# Patient Record
Sex: FEMALE | Race: BLACK OR AFRICAN AMERICAN | Employment: FULL TIME | URBAN - METROPOLITAN AREA
[De-identification: names, ages, dates, MRNs, and addresses within clinical notes are randomized per-mention and may not be internally consistent; named-entity substitution may affect disease eponyms.]

---

## 2020-07-20 ENCOUNTER — HOSPITAL ENCOUNTER (EMERGENCY)
Age: 40
Discharge: HOME | End: 2020-07-20
Attending: EMERGENCY MEDICINE
Payer: COMMERCIAL

## 2020-07-20 ENCOUNTER — APPOINTMENT (OUTPATIENT)
Dept: CT IMAGING | Age: 40
End: 2020-07-20
Payer: COMMERCIAL

## 2020-07-20 VITALS
HEIGHT: 63 IN | WEIGHT: 139 LBS | TEMPERATURE: 98.4 F | RESPIRATION RATE: 14 BRPM | HEART RATE: 57 BPM | BODY MASS INDEX: 24.63 KG/M2 | DIASTOLIC BLOOD PRESSURE: 97 MMHG | SYSTOLIC BLOOD PRESSURE: 134 MMHG | OXYGEN SATURATION: 100 %

## 2020-07-20 LAB
ALBUMIN SERPL-MCNC: 4.4 GM/DL (ref 3.4–5)
ALP BLD-CCNC: 48 IU/L (ref 40–128)
ALT SERPL-CCNC: 10 U/L (ref 10–40)
ANION GAP SERPL CALCULATED.3IONS-SCNC: 12 MMOL/L (ref 4–16)
AST SERPL-CCNC: 14 IU/L (ref 15–37)
BACTERIA: NEGATIVE /HPF
BASOPHILS ABSOLUTE: 0 K/CU MM
BASOPHILS RELATIVE PERCENT: 0.3 % (ref 0–1)
BILIRUB SERPL-MCNC: 0.2 MG/DL (ref 0–1)
BILIRUBIN URINE: NEGATIVE MG/DL
BLOOD, URINE: NEGATIVE
BUN BLDV-MCNC: 13 MG/DL (ref 6–23)
CALCIUM SERPL-MCNC: 9.1 MG/DL (ref 8.3–10.6)
CHLORIDE BLD-SCNC: 101 MMOL/L (ref 99–110)
CLARITY: CLEAR
CO2: 26 MMOL/L (ref 21–32)
COLOR: ABNORMAL
CREAT SERPL-MCNC: 0.8 MG/DL (ref 0.6–1.1)
DIFFERENTIAL TYPE: ABNORMAL
EOSINOPHILS ABSOLUTE: 0.2 K/CU MM
EOSINOPHILS RELATIVE PERCENT: 1 % (ref 0–3)
GFR AFRICAN AMERICAN: >60 ML/MIN/1.73M2
GFR NON-AFRICAN AMERICAN: >60 ML/MIN/1.73M2
GLUCOSE BLD-MCNC: 114 MG/DL (ref 70–99)
GLUCOSE, URINE: NEGATIVE MG/DL
GONADOTROPIN, CHORIONIC (HCG) QUANT: <0.5 UIU/ML
HCT VFR BLD CALC: 37.8 % (ref 37–47)
HEMOGLOBIN: 12.3 GM/DL (ref 12.5–16)
IMMATURE NEUTROPHIL %: 0.4 % (ref 0–0.43)
KETONES, URINE: NEGATIVE MG/DL
LEUKOCYTE ESTERASE, URINE: NEGATIVE
LYMPHOCYTES ABSOLUTE: 2 K/CU MM
LYMPHOCYTES RELATIVE PERCENT: 13.6 % (ref 24–44)
MAGNESIUM: 2.2 MG/DL (ref 1.8–2.4)
MCH RBC QN AUTO: 29 PG (ref 27–31)
MCHC RBC AUTO-ENTMCNC: 32.5 % (ref 32–36)
MCV RBC AUTO: 89.2 FL (ref 78–100)
MONOCYTES ABSOLUTE: 1.1 K/CU MM
MONOCYTES RELATIVE PERCENT: 7.4 % (ref 0–4)
NITRITE URINE, QUANTITATIVE: NEGATIVE
NUCLEATED RBC %: 0 %
PDW BLD-RTO: 14.7 % (ref 11.7–14.9)
PH, URINE: 7 (ref 5–8)
PLATELET # BLD: 289 K/CU MM (ref 140–440)
PMV BLD AUTO: 12 FL (ref 7.5–11.1)
POTASSIUM SERPL-SCNC: 3.4 MMOL/L (ref 3.5–5.1)
PROTEIN UA: NEGATIVE MG/DL
RBC # BLD: 4.24 M/CU MM (ref 4.2–5.4)
RBC URINE: ABNORMAL /HPF (ref 0–6)
SEGMENTED NEUTROPHILS ABSOLUTE COUNT: 11.3 K/CU MM
SEGMENTED NEUTROPHILS RELATIVE PERCENT: 77.3 % (ref 36–66)
SODIUM BLD-SCNC: 139 MMOL/L (ref 135–145)
SPECIFIC GRAVITY UA: 1.01 (ref 1–1.03)
SQUAMOUS EPITHELIAL: 5 /HPF
TOTAL IMMATURE NEUTOROPHIL: 0.06 K/CU MM
TOTAL NUCLEATED RBC: 0 K/CU MM
TOTAL PROTEIN: 7.5 GM/DL (ref 6.4–8.2)
TRICHOMONAS: ABNORMAL /HPF
UROBILINOGEN, URINE: NORMAL MG/DL (ref 0.2–1)
WBC # BLD: 14.6 K/CU MM (ref 4–10.5)
WBC UA: 1 /HPF (ref 0–5)

## 2020-07-20 PROCEDURE — 70450 CT HEAD/BRAIN W/O DYE: CPT

## 2020-07-20 PROCEDURE — 84702 CHORIONIC GONADOTROPIN TEST: CPT

## 2020-07-20 PROCEDURE — 81001 URINALYSIS AUTO W/SCOPE: CPT

## 2020-07-20 PROCEDURE — 99284 EMERGENCY DEPT VISIT MOD MDM: CPT

## 2020-07-20 PROCEDURE — 96374 THER/PROPH/DIAG INJ IV PUSH: CPT

## 2020-07-20 PROCEDURE — 6360000004 HC RX CONTRAST MEDICATION: Performed by: EMERGENCY MEDICINE

## 2020-07-20 PROCEDURE — 85025 COMPLETE CBC W/AUTO DIFF WBC: CPT

## 2020-07-20 PROCEDURE — 96375 TX/PRO/DX INJ NEW DRUG ADDON: CPT

## 2020-07-20 PROCEDURE — 80053 COMPREHEN METABOLIC PANEL: CPT

## 2020-07-20 PROCEDURE — 2580000003 HC RX 258: Performed by: EMERGENCY MEDICINE

## 2020-07-20 PROCEDURE — 6360000002 HC RX W HCPCS: Performed by: EMERGENCY MEDICINE

## 2020-07-20 PROCEDURE — 83735 ASSAY OF MAGNESIUM: CPT

## 2020-07-20 RX ORDER — SODIUM CHLORIDE 9 MG/ML
INJECTION, SOLUTION INTRAVENOUS CONTINUOUS
Status: DISCONTINUED | OUTPATIENT
Start: 2020-07-20 | End: 2020-07-20

## 2020-07-20 RX ORDER — NAPROXEN 500 MG/1
500 TABLET ORAL 2 TIMES DAILY
Qty: 60 TABLET | Refills: 0 | Status: ON HOLD | OUTPATIENT
Start: 2020-07-20 | End: 2022-07-05 | Stop reason: HOSPADM

## 2020-07-20 RX ORDER — DIPHENHYDRAMINE HYDROCHLORIDE 50 MG/ML
50 INJECTION INTRAMUSCULAR; INTRAVENOUS ONCE
Status: COMPLETED | OUTPATIENT
Start: 2020-07-20 | End: 2020-07-20

## 2020-07-20 RX ORDER — BUTALBITAL, ACETAMINOPHEN AND CAFFEINE 300; 40; 50 MG/1; MG/1; MG/1
1 CAPSULE ORAL EVERY 4 HOURS PRN
Qty: 84 CAPSULE | Refills: 0 | Status: SHIPPED | OUTPATIENT
Start: 2020-07-20

## 2020-07-20 RX ORDER — 0.9 % SODIUM CHLORIDE 0.9 %
1000 INTRAVENOUS SOLUTION INTRAVENOUS ONCE
Status: COMPLETED | OUTPATIENT
Start: 2020-07-20 | End: 2020-07-20

## 2020-07-20 RX ORDER — METOCLOPRAMIDE HYDROCHLORIDE 5 MG/ML
10 INJECTION INTRAMUSCULAR; INTRAVENOUS ONCE
Status: COMPLETED | OUTPATIENT
Start: 2020-07-20 | End: 2020-07-20

## 2020-07-20 RX ORDER — ONDANSETRON 4 MG/1
4 TABLET, ORALLY DISINTEGRATING ORAL EVERY 8 HOURS PRN
Qty: 15 TABLET | Refills: 0 | Status: SHIPPED | OUTPATIENT
Start: 2020-07-20 | End: 2022-03-08 | Stop reason: SDUPTHER

## 2020-07-20 RX ORDER — ACETAMINOPHEN 500 MG
1000 TABLET ORAL ONCE
Status: DISCONTINUED | OUTPATIENT
Start: 2020-07-20 | End: 2020-07-20

## 2020-07-20 RX ADMIN — IOPAMIDOL 100 ML: 755 INJECTION, SOLUTION INTRAVENOUS at 04:11

## 2020-07-20 RX ADMIN — SODIUM CHLORIDE 1000 ML: 9 INJECTION, SOLUTION INTRAVENOUS at 03:53

## 2020-07-20 RX ADMIN — DIPHENHYDRAMINE HYDROCHLORIDE 50 MG: 50 INJECTION INTRAMUSCULAR; INTRAVENOUS at 03:55

## 2020-07-20 RX ADMIN — METOCLOPRAMIDE HYDROCHLORIDE 10 MG: 5 INJECTION INTRAMUSCULAR; INTRAVENOUS at 03:58

## 2020-07-20 ASSESSMENT — ENCOUNTER SYMPTOMS
RESPIRATORY NEGATIVE: 1
PHOTOPHOBIA: 1
ALLERGIC/IMMUNOLOGIC NEGATIVE: 1
NAUSEA: 1

## 2020-07-20 ASSESSMENT — PAIN SCALES - GENERAL
PAINLEVEL_OUTOF10: 2
PAINLEVEL_OUTOF10: 8
PAINLEVEL_OUTOF10: 10

## 2020-07-20 ASSESSMENT — PAIN DESCRIPTION - PAIN TYPE
TYPE: ACUTE PAIN
TYPE: ACUTE PAIN

## 2020-07-20 ASSESSMENT — PAIN DESCRIPTION - DESCRIPTORS
DESCRIPTORS: HEADACHE
DESCRIPTORS: ACHING

## 2020-07-20 ASSESSMENT — PAIN DESCRIPTION - LOCATION
LOCATION: HEAD
LOCATION: HEAD

## 2020-07-20 ASSESSMENT — PAIN DESCRIPTION - FREQUENCY: FREQUENCY: CONTINUOUS

## 2020-07-20 NOTE — ED PROVIDER NOTES
 Drug use: Never    Sexual activity: Not on file   Lifestyle    Physical activity     Days per week: Not on file     Minutes per session: Not on file    Stress: Not on file   Relationships    Social connections     Talks on phone: Not on file     Gets together: Not on file     Attends Congregation service: Not on file     Active member of club or organization: Not on file     Attends meetings of clubs or organizations: Not on file     Relationship status: Not on file    Intimate partner violence     Fear of current or ex partner: Not on file     Emotionally abused: Not on file     Physically abused: Not on file     Forced sexual activity: Not on file   Other Topics Concern    Not on file   Social History Narrative    Not on file     No current facility-administered medications for this encounter. Current Outpatient Medications   Medication Sig Dispense Refill    butalbital-APAP-caffeine (FIORICET) -40 MG CAPS per capsule Take 1 capsule by mouth every 4 hours as needed for Headaches 84 capsule 0    ondansetron (ZOFRAN ODT) 4 MG disintegrating tablet Take 1 tablet by mouth every 8 hours as needed for Nausea 15 tablet 0    naproxen (NAPROSYN) 500 MG tablet Take 1 tablet by mouth 2 times daily 60 tablet 0      Allergies   Allergen Reactions    Flexeril [Cyclobenzaprine] Swelling    Imitrex [Sumatriptan] Other (See Comments)     Does something to her nerves.  Lyrica [Pregabalin] Other (See Comments)     Shuts her kidneys down     No current facility-administered medications for this encounter.       Current Outpatient Medications   Medication Sig Dispense Refill    butalbital-APAP-caffeine (FIORICET) -40 MG CAPS per capsule Take 1 capsule by mouth every 4 hours as needed for Headaches 84 capsule 0    ondansetron (ZOFRAN ODT) 4 MG disintegrating tablet Take 1 tablet by mouth every 8 hours as needed for Nausea 15 tablet 0    naproxen (NAPROSYN) 500 MG tablet Take 1 tablet by mouth 2 times daily 60 tablet 0       Nursing Notes Reviewed    VITAL SIGNS:  ED Triage Vitals [07/20/20 0144]   Enc Vitals Group      BP (!) 177/105      Pulse 69      Resp 18      Temp 98.4 °F (36.9 °C)      Temp Source Oral      SpO2 99 %      Weight       Height       Head Circumference       Peak Flow       Pain Score       Pain Loc       Pain Edu? Excl. in [address]? PHYSICAL EXAM:  Physical Exam  Vitals signs and nursing note reviewed. Constitutional:       General: She is not in acute distress. Appearance: Normal appearance. She is well-developed and well-groomed. She is not ill-appearing, toxic-appearing or diaphoretic. HENT:      Head: Normocephalic and atraumatic. Right Ear: External ear normal.      Left Ear: External ear normal.      Nose: No congestion or rhinorrhea. Mouth/Throat:      Pharynx: No oropharyngeal exudate or posterior oropharyngeal erythema. Eyes:      General: No scleral icterus. Right eye: No discharge. Left eye: No discharge. Extraocular Movements: Extraocular movements intact. Conjunctiva/sclera: Conjunctivae normal.      Pupils: Pupils are equal, round, and reactive to light. Neck:      Musculoskeletal: Full passive range of motion without pain and normal range of motion. Normal range of motion. No edema, erythema, neck rigidity, crepitus, injury, pain with movement or torticollis. Vascular: No JVD. Trachea: Phonation normal.      Meningeal: Brudzinski's sign absent. Cardiovascular:      Rate and Rhythm: Normal rate and regular rhythm. Pulses: Normal pulses. Heart sounds: Normal heart sounds. No murmur. No friction rub. No gallop. Pulmonary:      Effort: Pulmonary effort is normal. No respiratory distress. Breath sounds: Normal breath sounds. No stridor. No wheezing, rhonchi or rales. Abdominal:      General: Bowel sounds are normal. There is no distension. Palpations: Abdomen is soft. There is no mass. Tenderness: There is no abdominal tenderness. There is no guarding or rebound. Negative signs include Reese's sign, Rovsing's sign and McBurney's sign. Hernia: No hernia is present. Musculoskeletal: Normal range of motion. General: No swelling, tenderness, deformity or signs of injury. Right lower leg: No edema. Left lower leg: No edema. Skin:     General: Skin is warm. Coloration: Skin is not jaundiced. Findings: No bruising or lesion. Neurological:      General: No focal deficit present. Mental Status: She is alert and oriented to person, place, and time. GCS: GCS eye subscore is 4. GCS verbal subscore is 5. GCS motor subscore is 6. Cranial Nerves: Cranial nerves are intact. No cranial nerve deficit, dysarthria or facial asymmetry. Sensory: Sensation is intact. No sensory deficit. Motor: Motor function is intact. No weakness, tremor, atrophy, abnormal muscle tone or seizure activity. Coordination: Coordination is intact. Coordination normal. Finger-Nose-Finger Test normal.   Psychiatric:         Attention and Perception: Attention and perception normal.         Mood and Affect: Affect is tearful. Speech: Speech normal.         Behavior: Behavior normal. Behavior is not agitated, slowed, aggressive, withdrawn, hyperactive or combative. Behavior is cooperative. Thought Content:  Thought content normal.         Cognition and Memory: Cognition normal.         Judgment: Judgment normal.           I have reviewed andinterpreted all of the currently available lab results from this visit (if applicable):    Results for orders placed or performed during the hospital encounter of 07/20/20   CBC Auto Differential   Result Value Ref Range    WBC 14.6 (H) 4.0 - 10.5 K/CU MM    RBC 4.24 4.2 - 5.4 M/CU MM    Hemoglobin 12.3 (L) 12.5 - 16.0 GM/DL    Hematocrit 37.8 37 - 47 %    MCV 89.2 78 - 100 FL    MCH 29.0 27 - 31 PG    MCHC 32.5 32.0 - 36.0 %    RDW 14.7 11.7 - 14.9 %    Platelets 199 079 - 652 K/CU MM    MPV 12.0 (H) 7.5 - 11.1 FL    Differential Type AUTOMATED DIFFERENTIAL     Segs Relative 77.3 (H) 36 - 66 %    Lymphocytes % 13.6 (L) 24 - 44 %    Monocytes % 7.4 (H) 0 - 4 %    Eosinophils % 1.0 0 - 3 %    Basophils % 0.3 0 - 1 %    Segs Absolute 11.3 K/CU MM    Lymphocytes Absolute 2.0 K/CU MM    Monocytes Absolute 1.1 K/CU MM    Eosinophils Absolute 0.2 K/CU MM    Basophils Absolute 0.0 K/CU MM    Nucleated RBC % 0.0 %    Total Nucleated RBC 0.0 K/CU MM    Total Immature Neutrophil 0.06 K/CU MM    Immature Neutrophil % 0.4 0 - 0.43 %   CMP   Result Value Ref Range    Sodium 139 135 - 145 MMOL/L    Potassium 3.4 (L) 3.5 - 5.1 MMOL/L    Chloride 101 99 - 110 mMol/L    CO2 26 21 - 32 MMOL/L    BUN 13 6 - 23 MG/DL    CREATININE 0.8 0.6 - 1.1 MG/DL    Glucose 114 (H) 70 - 99 MG/DL    Calcium 9.1 8.3 - 10.6 MG/DL    Alb 4.4 3.4 - 5.0 GM/DL    Total Protein 7.5 6.4 - 8.2 GM/DL    Total Bilirubin 0.2 0.0 - 1.0 MG/DL    ALT 10 10 - 40 U/L    AST 14 (L) 15 - 37 IU/L    Alkaline Phosphatase 48 40 - 128 IU/L    GFR Non-African American >60 >60 mL/min/1.73m2    GFR African American >60 >60 mL/min/1.73m2    Anion Gap 12 4 - 16   Magnesium   Result Value Ref Range    Magnesium 2.2 1.8 - 2.4 mg/dl   HCG Serum, Quantitative   Result Value Ref Range    hCG Quant <0.5 UIU/ML   Urinalysis   Result Value Ref Range    Color, UA STRAW (A) YELLOW    Clarity, UA CLEAR CLEAR    Glucose, Urine NEGATIVE NEGATIVE MG/DL    Bilirubin Urine NEGATIVE NEGATIVE MG/DL    Ketones, Urine NEGATIVE NEGATIVE MG/DL    Specific Gravity, UA 1.006 1.001 - 1.035    Blood, Urine NEGATIVE NEGATIVE    pH, Urine 7.0 5.0 - 8.0    Protein, UA NEGATIVE NEGATIVE MG/DL    Urobilinogen, Urine NORMAL 0.2 - 1.0 MG/DL    Nitrite Urine, Quantitative NEGATIVE NEGATIVE    Leukocyte Esterase, Urine NEGATIVE NEGATIVE    RBC, UA NONE SEEN 0 - 6 /HPF    WBC, UA 1 0 - 5 /HPF    Bacteria, UA NEGATIVE NEGATIVE /HPF Squam Epithel, UA 5 /HPF    Trichomonas, UA NONE SEEN NONE SEEN /HPF        Radiographs (if obtained):  [] The following radiograph was interpreted by myself in the absence of a radiologist:  [x] Radiologist's Report Reviewed:    CT head    EKG (if obtained): (All EKG's are interpreted by myself in the absence of a cardiologist)    MDM:    Patient here with a headache. And she has a history of headaches this feels similar to previous just worse than normal.  She has not seen neurology in a while or taken her blood pressure medicine in a long time. She is had headache for least a constant day. Denies any fever she has had nausea vomiting photophobia phonophobia. She has no nuchal rigidity no fever no trauma no weakness numbness tingling neuro exam is otherwise normal.  Normal finger-to-nose no carotid bruits no nuchal rigidity. Will get labs imaging given migraine cocktail. Head CT performed negative labs show leukocytosis she is not pregnant or recently pregnant I do not think she has meningitis or brain bleed or subarachnoid hemorrhage or CVA. Patient recheck she feels better. Awaiting head CT read I looked at it I do not see thing obvious but waiting for official radiology report. Patient otherwise stable feels better. I will sign outpatient to Dr. Mayuri Lebron who will follow up with read and dispo patient.     CLINICAL IMPRESSION:  Final diagnoses:   Nonintractable headache, unspecified chronicity pattern, unspecified headache type       (Please note that portions of this note may have been completed with a voice recognition program. Efforts were made to edit the dictations but occasionally words aremis-transcribed.)    DISPOSITION REFERRAL (if applicable):  [facility] Emergency Department  [phone]    If symptoms worsen    [facility]  [address]

## 2020-07-21 ENCOUNTER — CARE COORDINATION (OUTPATIENT)
Dept: CARE COORDINATION | Age: 40
End: 2020-07-21

## 2020-07-21 NOTE — CARE COORDINATION
Call to check on pt status after recent ER visit for nausea, vomiting, photophobia, headache. Patient contacted regarding recent discharge and COVID-19 risk. Discussed COVID-19 related testing which was not done at this time. Test results were not done. Patient informed of results, if available? n/a     Care Transition Nurse/ Ambulatory Care Manager contacted the patient by telephone to perform post discharge assessment. Verified name and  with patient as identifiers. Patient has following risk factors of: no known risk factors. CTN/ACM reviewed discharge instructions, medical action plan and red flags related to discharge diagnosis. Reviewed and educated them on any new and changed medications related to discharge diagnosis. Advised obtaining a 90-day supply of all daily and as-needed medications. Education provided regarding infection prevention, and signs and symptoms of COVID-19 and when to seek medical attention with patient who verbalized understanding. Discussed exposure protocols and quarantine from [address] you at higher risk for severe illness  and given an opportunity for questions and concerns. The patient agrees to contact the COVID-19 hotline [phone] or PCP office for questions related to their healthcare. CTN/ACM provided contact information for future reference. From CDC: Are you at higher risk for severe illness?  Wash your hands often.  Avoid close contact (6 feet, which is about two arm lengths) with people who are sick.  Put distance between yourself and other people if COVID-19 is spreading in your community.  Clean and disinfect frequently touched surfaces.  Avoid all cruise travel and non-essential air travel.  Call your healthcare professional if you have concerns about COVID-19 and your underlying condition or if you are sick.     For more information on steps you can take to protect yourself, see CDC's How to Protect Yourself    Pt will be further monitored by COVID Loop Team based on severity of symptoms and risk factors. Spoke with pt, reports mild relief in headache with prescriptions given. She has an appt to est with PCP for 8/13, declines assistance with sooner appt. Pt denies additional symptoms or concern at present. Reviewed infection prevention. Pt is enrolled in Loop. Elsa Atwood RN  Ambulatory Care Manager  [phone] office/cell  [phone] fax  [email or web address]

## 2020-08-03 ENCOUNTER — CARE COORDINATION (OUTPATIENT)
Dept: CARE COORDINATION | Age: 40
End: 2020-08-03

## 2020-08-03 NOTE — CARE COORDINATION
Resolving COVID19 monitoring episode. No further outreach is planned, ACM signing off. India Johnson RN  Ambulatory Care Manager  [phone] office/cell  [phone] fax  [email or web address]

## 2021-02-02 ENCOUNTER — HOSPITAL ENCOUNTER (OUTPATIENT)
Dept: ULTRASOUND IMAGING | Age: 41
Discharge: HOME | End: 2021-02-02
Payer: COMMERCIAL

## 2021-02-02 ENCOUNTER — HOSPITAL ENCOUNTER (OUTPATIENT)
Age: 41
Discharge: HOME | End: 2021-02-02
Payer: COMMERCIAL

## 2021-02-02 DIAGNOSIS — B18.2 CHRONIC HEPATITIS C WITHOUT HEPATIC COMA (HCC): ICD-10-CM

## 2021-02-02 LAB
ALBUMIN SERPL-MCNC: 4.6 GM/DL (ref 3.4–5)
ALP BLD-CCNC: 69 IU/L (ref 40–129)
ALT SERPL-CCNC: 11 U/L (ref 10–40)
ANION GAP SERPL CALCULATED.3IONS-SCNC: 11 MMOL/L (ref 4–16)
AST SERPL-CCNC: 16 IU/L (ref 15–37)
BACTERIA: ABNORMAL /HPF
BASOPHILS ABSOLUTE: 0.1 K/CU MM
BASOPHILS RELATIVE PERCENT: 0.6 % (ref 0–1)
BILIRUB SERPL-MCNC: 0.2 MG/DL (ref 0–1)
BILIRUBIN DIRECT: 0.2 MG/DL (ref 0–0.3)
BILIRUBIN URINE: NEGATIVE MG/DL
BILIRUBIN, INDIRECT: 0 MG/DL (ref 0–0.7)
BLOOD, URINE: NEGATIVE
BUN BLDV-MCNC: 11 MG/DL (ref 6–23)
CALCIUM SERPL-MCNC: 8.5 MG/DL (ref 8.3–10.6)
CHLORIDE BLD-SCNC: 106 MMOL/L (ref 99–110)
CLARITY: ABNORMAL
CO2: 20 MMOL/L (ref 21–32)
COLOR: ABNORMAL
CREAT SERPL-MCNC: 0.9 MG/DL (ref 0.6–1.1)
DIFFERENTIAL TYPE: ABNORMAL
EOSINOPHILS ABSOLUTE: 0.1 K/CU MM
EOSINOPHILS RELATIVE PERCENT: 1.5 % (ref 0–3)
GFR AFRICAN AMERICAN: >60 ML/MIN/1.73M2
GFR NON-AFRICAN AMERICAN: >60 ML/MIN/1.73M2
GLUCOSE BLD-MCNC: 100 MG/DL (ref 70–99)
GLUCOSE, URINE: NEGATIVE MG/DL
HAV IGM SER IA-ACNC: NON REACTIVE
HCT VFR BLD CALC: 42.7 % (ref 37–47)
HEMOGLOBIN: 13.1 GM/DL (ref 12.5–16)
HEPATITIS B CORE IGM ANTIBODY: NON REACTIVE
HEPATITIS B SURFACE ANTIGEN: NON REACTIVE
HEPATITIS C ANTIBODY: ABNORMAL
IMMATURE NEUTROPHIL %: 0.3 % (ref 0–0.43)
KETONES, URINE: NEGATIVE MG/DL
LEUKOCYTE ESTERASE, URINE: NEGATIVE
LYMPHOCYTES ABSOLUTE: 2.5 K/CU MM
LYMPHOCYTES RELATIVE PERCENT: 27.9 % (ref 24–44)
MCH RBC QN AUTO: 27.8 PG (ref 27–31)
MCHC RBC AUTO-ENTMCNC: 30.7 % (ref 32–36)
MCV RBC AUTO: 90.5 FL (ref 78–100)
MONOCYTES ABSOLUTE: 0.9 K/CU MM
MONOCYTES RELATIVE PERCENT: 10.4 % (ref 0–4)
NITRITE URINE, QUANTITATIVE: NEGATIVE
NUCLEATED RBC %: 0 %
PDW BLD-RTO: 13.3 % (ref 11.7–14.9)
PH, URINE: 6 (ref 5–8)
PLATELET # BLD: 257 K/CU MM (ref 140–440)
PMV BLD AUTO: 12.6 FL (ref 7.5–11.1)
POTASSIUM SERPL-SCNC: 3.8 MMOL/L (ref 3.5–5.1)
PROTEIN UA: NEGATIVE MG/DL
RBC # BLD: 4.72 M/CU MM (ref 4.2–5.4)
RBC URINE: <1 /HPF (ref 0–6)
SEGMENTED NEUTROPHILS ABSOLUTE COUNT: 5.3 K/CU MM
SEGMENTED NEUTROPHILS RELATIVE PERCENT: 59.3 % (ref 36–66)
SODIUM BLD-SCNC: 137 MMOL/L (ref 135–145)
SPECIFIC GRAVITY UA: 1 (ref 1–1.03)
SQUAMOUS EPITHELIAL: 5 /HPF
TOTAL IMMATURE NEUTOROPHIL: 0.03 K/CU MM
TOTAL NUCLEATED RBC: 0 K/CU MM
TOTAL PROTEIN: 7.7 GM/DL (ref 6.4–8.2)
TRICHOMONAS: ABNORMAL /HPF
UROBILINOGEN, URINE: NORMAL MG/DL (ref 0.2–1)
VITAMIN D 25-HYDROXY: 25.59 NG/ML
WBC # BLD: 9 K/CU MM (ref 4–10.5)
WBC UA: 1 /HPF (ref 0–5)

## 2021-02-02 PROCEDURE — 81001 URINALYSIS AUTO W/SCOPE: CPT

## 2021-02-02 PROCEDURE — 76705 ECHO EXAM OF ABDOMEN: CPT

## 2021-02-02 PROCEDURE — 36415 COLL VENOUS BLD VENIPUNCTURE: CPT

## 2021-02-02 PROCEDURE — 85025 COMPLETE CBC W/AUTO DIFF WBC: CPT

## 2021-02-02 PROCEDURE — 82306 VITAMIN D 25 HYDROXY: CPT

## 2021-02-02 PROCEDURE — 87902 NFCT AGT GNTYP ALYS HEP C: CPT

## 2021-02-02 PROCEDURE — 80053 COMPREHEN METABOLIC PANEL: CPT

## 2021-02-02 PROCEDURE — 87522 HEPATITIS C REVRS TRNSCRPJ: CPT

## 2021-02-02 PROCEDURE — 82248 BILIRUBIN DIRECT: CPT

## 2021-02-02 PROCEDURE — 80074 ACUTE HEPATITIS PANEL: CPT

## 2021-02-05 LAB
HCV QUANTITATIVE: NOT DETECTED IU/ML
HEP CRNA PCR QNT INTERP: NOT DETECTED
HEPATITIS C RNA PCR QUANT: NOT DETECTED LOG IU/ML

## 2021-02-06 LAB — HEPATITIS C GENOTYPE: NORMAL

## 2022-03-08 ENCOUNTER — APPOINTMENT (OUTPATIENT)
Dept: GENERAL RADIOLOGY | Age: 42
End: 2022-03-08

## 2022-03-08 ENCOUNTER — HOSPITAL ENCOUNTER (EMERGENCY)
Age: 42
Discharge: HOME | End: 2022-03-08
Payer: COMMERCIAL

## 2022-03-08 VITALS
DIASTOLIC BLOOD PRESSURE: 91 MMHG | TEMPERATURE: 99.4 F | HEART RATE: 66 BPM | OXYGEN SATURATION: 97 % | RESPIRATION RATE: 18 BRPM | WEIGHT: 162 LBS | SYSTOLIC BLOOD PRESSURE: 121 MMHG | BODY MASS INDEX: 28.7 KG/M2 | HEIGHT: 63 IN

## 2022-03-08 DIAGNOSIS — R05.9 COUGH: ICD-10-CM

## 2022-03-08 DIAGNOSIS — R11.2 NON-INTRACTABLE VOMITING WITH NAUSEA, UNSPECIFIED VOMITING TYPE: ICD-10-CM

## 2022-03-08 DIAGNOSIS — R51.9 HEADACHE, UNSPECIFIED HEADACHE TYPE: ICD-10-CM

## 2022-03-08 DIAGNOSIS — N39.0 URINARY TRACT INFECTION WITHOUT HEMATURIA, SITE UNSPECIFIED: Primary | ICD-10-CM

## 2022-03-08 LAB
ALBUMIN SERPL-MCNC: 3.8 GM/DL (ref 3.4–5)
ALP BLD-CCNC: 69 IU/L (ref 40–129)
ALT SERPL-CCNC: 23 U/L (ref 10–40)
ANION GAP SERPL CALCULATED.3IONS-SCNC: 11 MMOL/L (ref 4–16)
AST SERPL-CCNC: 29 IU/L (ref 15–37)
BACTERIA: ABNORMAL /HPF
BASOPHILS ABSOLUTE: 0 K/CU MM
BASOPHILS RELATIVE PERCENT: 0.4 % (ref 0–1)
BILIRUB SERPL-MCNC: 0.2 MG/DL (ref 0–1)
BILIRUBIN URINE: NEGATIVE MG/DL
BLOOD, URINE: ABNORMAL
BUN BLDV-MCNC: 10 MG/DL (ref 6–23)
CALCIUM SERPL-MCNC: 8.5 MG/DL (ref 8.3–10.6)
CHLORIDE BLD-SCNC: 103 MMOL/L (ref 99–110)
CLARITY: ABNORMAL
CO2: 20 MMOL/L (ref 21–32)
COLOR: YELLOW
CREAT SERPL-MCNC: 0.8 MG/DL (ref 0.6–1.1)
DIFFERENTIAL TYPE: ABNORMAL
EOSINOPHILS ABSOLUTE: 0.3 K/CU MM
EOSINOPHILS RELATIVE PERCENT: 4 % (ref 0–3)
GFR AFRICAN AMERICAN: >60 ML/MIN/1.73M2
GFR NON-AFRICAN AMERICAN: >60 ML/MIN/1.73M2
GLUCOSE BLD-MCNC: 102 MG/DL (ref 70–99)
GLUCOSE, URINE: NEGATIVE MG/DL
HCT VFR BLD CALC: 40.1 % (ref 37–47)
HEMOGLOBIN: 12.4 GM/DL (ref 12.5–16)
IMMATURE NEUTROPHIL %: 0.3 % (ref 0–0.43)
INTERPRETATION: NORMAL
KETONES, URINE: NEGATIVE MG/DL
LEUKOCYTE ESTERASE, URINE: ABNORMAL
LYMPHOCYTES ABSOLUTE: 1.5 K/CU MM
LYMPHOCYTES RELATIVE PERCENT: 20.7 % (ref 24–44)
MCH RBC QN AUTO: 28.1 PG (ref 27–31)
MCHC RBC AUTO-ENTMCNC: 30.9 % (ref 32–36)
MCV RBC AUTO: 90.7 FL (ref 78–100)
MONOCYTES ABSOLUTE: 1 K/CU MM
MONOCYTES RELATIVE PERCENT: 13.7 % (ref 0–4)
MUCUS: ABNORMAL HPF
NITRITE URINE, QUANTITATIVE: POSITIVE
NUCLEATED RBC %: 0 %
PDW BLD-RTO: 13.3 % (ref 11.7–14.9)
PH, URINE: 6 (ref 5–8)
PLATELET # BLD: 222 K/CU MM (ref 140–440)
PMV BLD AUTO: 12.3 FL (ref 7.5–11.1)
POTASSIUM SERPL-SCNC: 3.6 MMOL/L (ref 3.5–5.1)
PREGNANCY, URINE: NEGATIVE
PROTEIN UA: NEGATIVE MG/DL
RBC # BLD: 4.42 M/CU MM (ref 4.2–5.4)
RBC URINE: 3 /HPF (ref 0–6)
SARS-COV-2, NAAT: NOT DETECTED
SEGMENTED NEUTROPHILS ABSOLUTE COUNT: 4.3 K/CU MM
SEGMENTED NEUTROPHILS RELATIVE PERCENT: 60.9 % (ref 36–66)
SODIUM BLD-SCNC: 134 MMOL/L (ref 135–145)
SOURCE: NORMAL
SPECIFIC GRAVITY UA: 1.02 (ref 1–1.03)
SPECIFIC GRAVITY, URINE: 1.02 (ref 1–1.03)
SQUAMOUS EPITHELIAL: 5 /HPF
TOTAL IMMATURE NEUTOROPHIL: 0.02 K/CU MM
TOTAL NUCLEATED RBC: 0 K/CU MM
TOTAL PROTEIN: 7 GM/DL (ref 6.4–8.2)
TROPONIN T: <0.01 NG/ML
UROBILINOGEN, URINE: 0.2 MG/DL (ref 0.2–1)
WBC # BLD: 7.1 K/CU MM (ref 4–10.5)
WBC UA: 43 /HPF (ref 0–5)

## 2022-03-08 PROCEDURE — 81001 URINALYSIS AUTO W/SCOPE: CPT

## 2022-03-08 PROCEDURE — 87186 SC STD MICRODIL/AGAR DIL: CPT

## 2022-03-08 PROCEDURE — 85025 COMPLETE CBC W/AUTO DIFF WBC: CPT

## 2022-03-08 PROCEDURE — 81025 URINE PREGNANCY TEST: CPT

## 2022-03-08 PROCEDURE — 87635 SARS-COV-2 COVID-19 AMP PRB: CPT

## 2022-03-08 PROCEDURE — 84484 ASSAY OF TROPONIN QUANT: CPT

## 2022-03-08 PROCEDURE — 87086 URINE CULTURE/COLONY COUNT: CPT

## 2022-03-08 PROCEDURE — 99283 EMERGENCY DEPT VISIT LOW MDM: CPT

## 2022-03-08 PROCEDURE — 87088 URINE BACTERIA CULTURE: CPT

## 2022-03-08 PROCEDURE — 80053 COMPREHEN METABOLIC PANEL: CPT

## 2022-03-08 PROCEDURE — 93005 ELECTROCARDIOGRAM TRACING: CPT | Performed by: PHYSICIAN ASSISTANT

## 2022-03-08 PROCEDURE — 71045 X-RAY EXAM CHEST 1 VIEW: CPT

## 2022-03-08 RX ORDER — ONDANSETRON 4 MG/1
4 TABLET, ORALLY DISINTEGRATING ORAL EVERY 8 HOURS PRN
Qty: 15 TABLET | Refills: 0 | Status: SHIPPED | OUTPATIENT
Start: 2022-03-08

## 2022-03-08 RX ORDER — BENZONATATE 100 MG/1
100 CAPSULE ORAL 3 TIMES DAILY PRN
Qty: 21 CAPSULE | Refills: 0 | Status: SHIPPED | OUTPATIENT
Start: 2022-03-08 | End: 2022-03-15

## 2022-03-08 RX ORDER — CEPHALEXIN 500 MG/1
500 CAPSULE ORAL 2 TIMES DAILY
Qty: 14 CAPSULE | Refills: 0 | Status: SHIPPED | OUTPATIENT
Start: 2022-03-08 | End: 2022-03-15

## 2022-03-08 NOTE — ED PROVIDER NOTES
Patient's EKG is interpreted by me sinus rhythm rate 63   no ST elevation no ST depression I appreciate no acute ischemia or infarctions EKG no old EKGs with which to compare     Jennifer Hardy MD  03/08/22 [id number]

## 2022-03-08 NOTE — ED PROVIDER NOTES
PROVIDER IN [address] [place] [address]  eMERGENCY dEPARTMENT eNCOUnter      Pt Name: Candelario Boast  MRN: [id number]  Date of evaluation: 3/8/2022      As provider-in-triage, I performed a medical screening history and physical exam on this patient. Chief Complaint:    Chief Complaint   Patient presents with    Cough    Emesis    Headache       HPI:  This is a  39 y.o. female multiple complaints, new onset 4 days ago, including sore throat, cough, headache, sinusitis symptoms. She also mentions since then her symptoms are starting to make her feel lightheaded. She describes gradual onset of headache. She does mention she got a COVID vaccine. Denying any abdominal pain, nausea, vomiting, new bowel movement changes, fever, sick contacts      PHYSICAL EXAM  BP (!) 163/96   Pulse 64   Temp 99.4 °F (37.4 °C) (Oral)   Resp 18   Ht 5' 3\" (1.6 m)   Wt 162 lb (73.5 kg)   SpO2 96%   BMI 28.70 kg/m²     Physical Exam: (Pertinent Negatives and Positives)    On exam, the patient appears well-hydrated, well-nourished, and in no acute distress. Mucous membranes are moist. Speech is clear. Breathing is unlabored. Lungs CTA  Skin is dry. Mental status is normal. Moves all extremities, and is without facial droop. Patient seen and examined in triage area and/or waiting room. I performed a medical screening history and physical exam on this patient. Work-up initiated secondary to this medical screening. Please see the full history, physical and final disposition note by the other provider in main emergency department      Electronically signed, Ulysses Luke PA-C,          As provider-in-triage,   Comment: Please note this report has been produced using speech recognition software and may contain errors related to that system including errors in grammar, punctuation, and spelling, as well as words and phrases that may be inappropriate.  If there are any questions or concerns please feel free to contact the dictating provider for clarification.               Duong Mendoza PA-C  03/08/22 [id number]       Duong Mendoza PA-C  03/10/22 [id number]

## 2022-03-08 NOTE — Clinical Note
Tati Raymond was seen and treated in our emergency department on 3/8/2022. She may return to work on 03/10/2022. If you have any questions or concerns, please don't hesitate to call.       Bam Harley PA-C

## 2022-03-09 LAB
EKG ATRIAL RATE: 63 BPM
EKG DIAGNOSIS: NORMAL
EKG P AXIS: 59 DEGREES
EKG P-R INTERVAL: 136 MS
EKG Q-T INTERVAL: 390 MS
EKG QRS DURATION: 80 MS
EKG QTC CALCULATION (BAZETT): 399 MS
EKG R AXIS: 18 DEGREES
EKG T AXIS: 45 DEGREES
EKG VENTRICULAR RATE: 63 BPM

## 2022-03-09 PROCEDURE — 93010 ELECTROCARDIOGRAM REPORT: CPT | Performed by: INTERNAL MEDICINE

## 2022-03-09 NOTE — ED PROVIDER NOTES
EMERGENCY DEPARTMENT ENCOUNTER      PCP: Kavin Mendez MD    [facility]    Chief Complaint   Patient presents with    Cough    Emesis    Headache       This patient was not evaluated by the attending physician. I have independently evaluated this patient. My supervising physician was available for consultation. HPI    Marques Kaur is a 39 y.o. female who presents with nasal congestion, cough, headache, nausea, vomiting. Onset was 4 days ago. Duration has been constant and worsening. Duration has been constant since onset. Aggravating factors are coughing worsens headache. Alleviating factors are denied. She has been taking some OTC cough/cold medications and did feel a little lightheaded after taking sudafed. Headache is not worst of life and wasn't sudden in onset. Patient denies recent sick contact. .    Patient denies ear pain, dysuria, urinary urgency, urinary frequency, hematuria, difficulty swallowing, shortness of breath, wheezing, hemoptysis, abdominal pain, numbness, weakness, tingling, vision changes, hearing changes, speech changes, syncope, dizziness. REVIEW OF SYSTEMS    10 systems were reviewed and are negative unless otherwise specified    See HPI and nursing notes for additional information       PAST MEDICAL AND SURGICAL HISTORY    History reviewed. No pertinent past medical history. History reviewed. No pertinent surgical history.     CURRENT MEDICATIONS    Current Outpatient Rx   Medication Sig Dispense Refill    ondansetron (ZOFRAN ODT) 4 MG disintegrating tablet Take 1 tablet by mouth every 8 hours as needed for Nausea or Vomiting 15 tablet 0    cephALEXin (KEFLEX) 500 MG capsule Take 1 capsule by mouth 2 times daily for 7 days 14 capsule 0    benzonatate (TESSALON PERLES) 100 MG capsule Take 1 capsule by mouth 3 times daily as needed for Cough 21 capsule 0    butalbital-APAP-caffeine (FIORICET) -40 MG CAPS per capsule Take 1 capsule by mouth every 4 hours as needed for Headaches 84 capsule 0    naproxen (NAPROSYN) 500 MG tablet Take 1 tablet by mouth 2 times daily 60 tablet 0       ALLERGIES    Allergies   Allergen Reactions    Flexeril [Cyclobenzaprine] Swelling    Imitrex [Sumatriptan] Other (See Comments)     Does something to her nerves.  Lyrica [Pregabalin] Other (See Comments)     Shuts her kidneys down       FAMILY AND SOCIAL HISTORY    History reviewed. No pertinent family history. Social History     Socioeconomic History    Marital status: Single     Spouse name: None    Number of children: None    Years of education: None    Highest education level: None   Occupational History    None   Tobacco Use    Smoking status: Current Every Day Smoker    Smokeless tobacco: None   Substance and Sexual Activity    Alcohol use: Yes     Comment: occ    Drug use: Never    Sexual activity: None   Other Topics Concern    None   Social History Narrative    None     Social Determinants of Health     Financial Resource Strain:     Difficulty of Paying Living Expenses: Not on file   Food Insecurity:     Worried About Running Out of Food in the Last Year: Not on file    Valentin of Food in the Last Year: Not on file   Transportation Needs:     Lack of Transportation (Medical): Not on file    Lack of Transportation (Non-Medical):  Not on file   Physical Activity:     Days of Exercise per Week: Not on file    Minutes of Exercise per Session: Not on file   Stress:     Feeling of Stress : Not on file   Social Connections:     Frequency of Communication with Friends and Family: Not on file    Frequency of Social Gatherings with Friends and Family: Not on file    Attends [employer] Services: Not on file    Active Member of Clubs or Organizations: Not on file    Attends Club or Organization Meetings: Not on file    Marital Status: Not on file   Intimate Partner Violence:     Fear of Current or Ex-Partner: Not on file    Emotionally Abused: Not on file  Physically Abused: Not on file    Sexually Abused: Not on file   Housing Stability:     Unable to Pay for Housing in the Last Year: Not on file    Number of Places Lived in the Last Year: Not on file    Unstable Housing in the Last Year: Not on file       PHYSICAL EXAM    VITAL SIGNS: BP (!) 121/91   Pulse 66   Temp 99.4 °F (37.4 °C) (Oral)   Resp 18   Ht 5' 3\" (1.6 m)   Wt 162 lb (73.5 kg)   SpO2 97%   BMI 28.70 kg/m²   Constitutional:  Well developed, well nourished, no acute distress, non-toxic appearance   Eyes: Conjunctiva normal, sclera non-icteric  HEENT:     - Normocephalic, atraumatic   - EOM intact. Conjunctiva normal    - Nasal passages with mildly erythematous and edematous turbinates. No masses. - Oropharynx mildly erythematous without tonsillar hypertrophy or exudate. Uvula is midline and rises evenly with phonation. Neck:    - supple, no JVD     Respiratory:    Clear to auscultation in bilateral lung fields, no wheezes/rales/rhonchi. No retractions, no accessory muscle use  Cardiovascular:  Normal rate, normal rhythm   GI:  Soft, no abdominal tenderness, no guarding. No CVA TTP bilaterally.   Neurologic:    - Alert & oriented person, place, time, and situation, no speech difficulties or slurring.  - No obvious gross motor deficits  - Cranial nerves 2-12 grossly intact  - Negative meningeal signs.  - No truncal ataxia  Musculoskeletal:  No obvious deficits, no edema   Integument:  Skin pink, warm, dry, intact       LABS:  Results for orders placed or performed during the hospital encounter of 03/08/22   COVID-19, Rapid    Specimen: Nasopharyngeal   Result Value Ref Range    Source UNKNOWN     SARS-CoV-2, NAAT NOT DETECTED NOT DETECTED   CBC with Auto Differential   Result Value Ref Range    WBC 7.1 4.0 - 10.5 K/CU MM    RBC 4.42 4.2 - 5.4 M/CU MM    Hemoglobin 12.4 (L) 12.5 - 16.0 GM/DL    Hematocrit 40.1 37 - 47 %    MCV 90.7 78 - 100 FL    MCH 28.1 27 - 31 PG    MCHC 30.9 (L) NEGATIVE HPF   HCG, Urine, Qualitative, Pregnancy (Lab)   Result Value Ref Range    Pregnancy, Urine NEGATIVE NEGATIVE    Specific Gravity, Urine 1.020 1.001 - 1.035    Interpretation HCG METHOD LIMITATIONS:    Troponin   Result Value Ref Range    Troponin T <0.010 <0.01 NG/ML   EKG 12 Lead   Result Value Ref Range    Ventricular Rate 63 BPM    Atrial Rate 63 BPM    P-R Interval 136 ms    QRS Duration 80 ms    Q-T Interval 390 ms    QTc Calculation (Bazett) 399 ms    P Axis 59 degrees    R Axis 18 degrees    T Axis 45 degrees    Diagnosis       Normal sinus rhythm  Normal ECG  No previous ECGs available  Confirmed by Santos Alamo MD, Galina Holguin ([id number]) on 3/9/2022 6:54:45 AM         EKG Interpretation  Please see ED physician's note for EKG interpretation - Dr. Wilma Benitez      RADIOLOGY/PROCEDURES    XR CHEST PORTABLE   Final Result   No acute process. ED COURSE & MEDICAL DECISION MAKING       Vital signs and nursing notes reviewed during ED course. I have independently evaluated this patient. Supervising physician present in the Emergency Department, available for consultation, throughout entirety of  patient care. All pertinent Lab data and radiographic results reviewed with patient at bedside. Patient presents as above which prompted workup today. Labs, imaging, and EKG were obtained. For EKG interpretation- see ED physician's note. Chest xray obtained today and reveals no acute cardiopulmonary process. No pneumothorax, no consolidation concerning for pneumonia, no pleural effusion. Labs reveal mild anemia and urine appears consistent with UTI. Will treat for UTI as patient feels generally unwell despite her denying urinary symptoms. No leukocytosis, fever, or flank pain to suggest pyelonephritis. Urine culture sent.  Patient is neurologically intact on exam. I discussed the likely viral etiology of patient's URI symptoms with patient today and recommend symptomatic treatment with increase fluids and rest. COVID test was negative today. I prescribed patient keflex, tessalon perles, zofran- we discussed medications. Patient declines receiving any medications in the ED and would like to be discharge at this time. Patient is nontoxic appearing. Vital signs stable. Patient is stable for outpatient management. The patient and/or the family were informed of the results of any tests/labs/imaging, the treatment plan, and time was allotted to answer questions. Diagnosis, disposition, and plan discussed in detail with patient who understands and agrees. Patient understands and agrees to follow up with PCP for recheck in the next 2-3 days. Patient understands and agrees to return to emergency department if symptoms worsen or any new symptoms develop- strict return precautions given. Vitals:    03/08/22 1228 03/08/22 1711   BP: (!) 163/96 (!) 121/91   Pulse: 64 66   Resp: 18    Temp: 99.4 °F (37.4 °C)    TempSrc: Oral    SpO2: 96% 97%   Weight: 162 lb (73.5 kg)    Height: 5' 3\" (1.6 m)          Clinical  IMPRESSION    1. Urinary tract infection without hematuria, site unspecified    2. Headache, unspecified headache type    3. Cough    4.  Non-intractable vomiting with nausea, unspecified vomiting type           Disposition referral (if applicable):  Esme Torres MD  [address] [facility]  [phone]    Call today  Recheck in 2-3 days    [facility] Emergency Department  [place] [id number] [phone]  Go to   Return to ED if symptoms worsen or new symptoms      Disposition medications (if applicable):  Discharge Medication List as of 3/8/2022  4:57 PM      START taking these medications    Details   cephALEXin (KEFLEX) 500 MG capsule Take 1 capsule by mouth 2 times daily for 7 days, Disp-14 capsule, R-0Normal      benzonatate (TESSALON PERLES) 100 MG capsule Take 1 capsule by mouth 3 times daily as needed for Cough, Disp-21 capsule, R-0Normal                 Comment: Please note this report has been produced using speech recognition software and may contain errors related to that system including errors in grammar, punctuation, and spelling, as well as words and phrases that may be inappropriate. If there are any questions or concerns please feel free to contact the dictating provider for clarification.         Beronica Infante PA-C  03/10/22 4722

## 2022-03-10 LAB
CULTURE: ABNORMAL
CULTURE: ABNORMAL
Lab: ABNORMAL
SPECIMEN: ABNORMAL

## 2022-07-02 ENCOUNTER — APPOINTMENT (OUTPATIENT)
Dept: GENERAL RADIOLOGY | Age: 42
DRG: 659 | End: 2022-07-02

## 2022-07-02 ENCOUNTER — APPOINTMENT (OUTPATIENT)
Dept: CT IMAGING | Age: 42
DRG: 659 | End: 2022-07-02

## 2022-07-02 ENCOUNTER — HOSPITAL ENCOUNTER (INPATIENT)
Age: 42
LOS: 4 days | Discharge: HOME | DRG: 659 | End: 2022-07-07
Attending: STUDENT IN AN ORGANIZED HEALTH CARE EDUCATION/TRAINING PROGRAM | Admitting: INTERNAL MEDICINE

## 2022-07-02 DIAGNOSIS — N20.1 URETEROLITHIASIS: Primary | ICD-10-CM

## 2022-07-02 DIAGNOSIS — R10.9 FLANK PAIN: ICD-10-CM

## 2022-07-02 LAB
ALBUMIN SERPL-MCNC: 4.1 GM/DL (ref 3.4–5)
ALP BLD-CCNC: 72 IU/L (ref 40–129)
ALT SERPL-CCNC: 13 U/L (ref 10–40)
ANION GAP SERPL CALCULATED.3IONS-SCNC: 15 MMOL/L (ref 4–16)
ANION GAP SERPL CALCULATED.3IONS-SCNC: 16 MMOL/L (ref 4–16)
AST SERPL-CCNC: 20 IU/L (ref 15–37)
BASOPHILS ABSOLUTE: 0.1 K/CU MM
BASOPHILS RELATIVE PERCENT: 0.3 % (ref 0–1)
BILIRUB SERPL-MCNC: 0.4 MG/DL (ref 0–1)
BILIRUBIN DIRECT: 0.2 MG/DL (ref 0–0.3)
BILIRUBIN, INDIRECT: 0.2 MG/DL (ref 0–0.7)
BUN BLDV-MCNC: 16 MG/DL (ref 6–23)
BUN BLDV-MCNC: 16 MG/DL (ref 6–23)
CALCIUM SERPL-MCNC: 8.4 MG/DL (ref 8.3–10.6)
CALCIUM SERPL-MCNC: 8.9 MG/DL (ref 8.3–10.6)
CHLORIDE BLD-SCNC: 98 MMOL/L (ref 99–110)
CHLORIDE BLD-SCNC: 98 MMOL/L (ref 99–110)
CO2: 19 MMOL/L (ref 21–32)
CO2: 19 MMOL/L (ref 21–32)
CREAT SERPL-MCNC: 1.5 MG/DL (ref 0.6–1.1)
CREAT SERPL-MCNC: 1.5 MG/DL (ref 0.6–1.1)
DIFFERENTIAL TYPE: ABNORMAL
EOSINOPHILS ABSOLUTE: 0.1 K/CU MM
EOSINOPHILS RELATIVE PERCENT: 0.4 % (ref 0–3)
GFR AFRICAN AMERICAN: 46 ML/MIN/1.73M2
GFR AFRICAN AMERICAN: 46 ML/MIN/1.73M2
GFR NON-AFRICAN AMERICAN: 38 ML/MIN/1.73M2
GFR NON-AFRICAN AMERICAN: 38 ML/MIN/1.73M2
GLUCOSE BLD-MCNC: 130 MG/DL (ref 70–99)
GLUCOSE BLD-MCNC: 143 MG/DL (ref 70–99)
GONADOTROPIN, CHORIONIC (HCG) QUANT: 0.5 UIU/ML
HCT VFR BLD CALC: 36.5 % (ref 37–47)
HEMOGLOBIN: 11.6 GM/DL (ref 12.5–16)
IMMATURE NEUTROPHIL %: 1.1 % (ref 0–0.43)
INTERPRETATION: NORMAL
LIPASE: 24 IU/L (ref 13–60)
LYMPHOCYTES ABSOLUTE: 0.9 K/CU MM
LYMPHOCYTES RELATIVE PERCENT: 3.2 % (ref 24–44)
MAGNESIUM: 1.7 MG/DL (ref 1.8–2.4)
MCH RBC QN AUTO: 28.1 PG (ref 27–31)
MCHC RBC AUTO-ENTMCNC: 31.8 % (ref 32–36)
MCV RBC AUTO: 88.4 FL (ref 78–100)
MONOCYTES ABSOLUTE: 2.2 K/CU MM
MONOCYTES RELATIVE PERCENT: 7.9 % (ref 0–4)
NUCLEATED RBC %: 0 %
PDW BLD-RTO: 13.8 % (ref 11.7–14.9)
PLATELET # BLD: 193 K/CU MM (ref 140–440)
PMV BLD AUTO: 12.1 FL (ref 7.5–11.1)
POTASSIUM SERPL-SCNC: 3.2 MMOL/L (ref 3.5–5.1)
POTASSIUM SERPL-SCNC: 3.3 MMOL/L (ref 3.5–5.1)
PREGNANCY, URINE: NEGATIVE
PRO-BNP: 975.4 PG/ML
RBC # BLD: 4.13 M/CU MM (ref 4.2–5.4)
REASON FOR REJECTION: NORMAL
REJECTED TEST: NORMAL
SEGMENTED NEUTROPHILS ABSOLUTE COUNT: 23.7 K/CU MM
SEGMENTED NEUTROPHILS RELATIVE PERCENT: 87.1 % (ref 36–66)
SODIUM BLD-SCNC: 132 MMOL/L (ref 135–145)
SODIUM BLD-SCNC: 133 MMOL/L (ref 135–145)
SPECIFIC GRAVITY, URINE: 1.01 (ref 1–1.03)
TOTAL IMMATURE NEUTOROPHIL: 0.29 K/CU MM
TOTAL NUCLEATED RBC: 0 K/CU MM
TOTAL PROTEIN: 6.9 GM/DL (ref 6.4–8.2)
TROPONIN T: <0.01 NG/ML
WBC # BLD: 27.2 K/CU MM (ref 4–10.5)

## 2022-07-02 PROCEDURE — 83690 ASSAY OF LIPASE: CPT

## 2022-07-02 PROCEDURE — 80048 BASIC METABOLIC PNL TOTAL CA: CPT

## 2022-07-02 PROCEDURE — 2580000003 HC RX 258: Performed by: STUDENT IN AN ORGANIZED HEALTH CARE EDUCATION/TRAINING PROGRAM

## 2022-07-02 PROCEDURE — 96365 THER/PROPH/DIAG IV INF INIT: CPT

## 2022-07-02 PROCEDURE — 81025 URINE PREGNANCY TEST: CPT

## 2022-07-02 PROCEDURE — 71045 X-RAY EXAM CHEST 1 VIEW: CPT

## 2022-07-02 PROCEDURE — 99285 EMERGENCY DEPT VISIT HI MDM: CPT

## 2022-07-02 PROCEDURE — 74176 CT ABD & PELVIS W/O CONTRAST: CPT

## 2022-07-02 PROCEDURE — 6360000002 HC RX W HCPCS: Performed by: STUDENT IN AN ORGANIZED HEALTH CARE EDUCATION/TRAINING PROGRAM

## 2022-07-02 PROCEDURE — 85025 COMPLETE CBC W/AUTO DIFF WBC: CPT

## 2022-07-02 PROCEDURE — 87086 URINE CULTURE/COLONY COUNT: CPT

## 2022-07-02 PROCEDURE — 82248 BILIRUBIN DIRECT: CPT

## 2022-07-02 PROCEDURE — 83880 ASSAY OF NATRIURETIC PEPTIDE: CPT

## 2022-07-02 PROCEDURE — 81001 URINALYSIS AUTO W/SCOPE: CPT

## 2022-07-02 PROCEDURE — 6370000000 HC RX 637 (ALT 250 FOR IP): Performed by: STUDENT IN AN ORGANIZED HEALTH CARE EDUCATION/TRAINING PROGRAM

## 2022-07-02 PROCEDURE — 96375 TX/PRO/DX INJ NEW DRUG ADDON: CPT

## 2022-07-02 PROCEDURE — 84484 ASSAY OF TROPONIN QUANT: CPT

## 2022-07-02 PROCEDURE — 83735 ASSAY OF MAGNESIUM: CPT

## 2022-07-02 PROCEDURE — 80053 COMPREHEN METABOLIC PANEL: CPT

## 2022-07-02 PROCEDURE — 84702 CHORIONIC GONADOTROPIN TEST: CPT

## 2022-07-02 PROCEDURE — 51702 INSERT TEMP BLADDER CATH: CPT

## 2022-07-02 RX ORDER — OXYCODONE HYDROCHLORIDE AND ACETAMINOPHEN 5; 325 MG/1; MG/1
1 TABLET ORAL ONCE
Status: COMPLETED | OUTPATIENT
Start: 2022-07-02 | End: 2022-07-02

## 2022-07-02 RX ORDER — MORPHINE SULFATE 4 MG/ML
4 INJECTION, SOLUTION INTRAMUSCULAR; INTRAVENOUS EVERY 4 HOURS PRN
Status: DISCONTINUED | OUTPATIENT
Start: 2022-07-02 | End: 2022-07-03

## 2022-07-02 RX ADMIN — MORPHINE SULFATE 4 MG: 4 INJECTION, SOLUTION INTRAMUSCULAR; INTRAVENOUS at 21:21

## 2022-07-02 RX ADMIN — CEFTRIAXONE SODIUM 1000 MG: 1 INJECTION, POWDER, FOR SOLUTION INTRAMUSCULAR; INTRAVENOUS at 21:20

## 2022-07-02 RX ADMIN — OXYCODONE HYDROCHLORIDE AND ACETAMINOPHEN 1 TABLET: 5; 325 TABLET ORAL at 19:48

## 2022-07-02 ASSESSMENT — PAIN SCALES - GENERAL
PAINLEVEL_OUTOF10: 8
PAINLEVEL_OUTOF10: 10
PAINLEVEL_OUTOF10: 9

## 2022-07-02 ASSESSMENT — PAIN DESCRIPTION - ORIENTATION: ORIENTATION: LEFT;RIGHT

## 2022-07-02 ASSESSMENT — PAIN DESCRIPTION - DESCRIPTORS: DESCRIPTORS: SQUEEZING

## 2022-07-02 ASSESSMENT — PAIN DESCRIPTION - PAIN TYPE: TYPE: ACUTE PAIN

## 2022-07-02 ASSESSMENT — PAIN DESCRIPTION - LOCATION: LOCATION: ABDOMEN

## 2022-07-02 ASSESSMENT — PAIN DESCRIPTION - FREQUENCY: FREQUENCY: CONTINUOUS

## 2022-07-02 NOTE — Clinical Note
Ruth Ann Arcos accompanied Harry Martins to the emergency department on 7/2/2022. They may return to work on 07/05/2022. Jamarcus Munguia will be attending to his family member in [facility] while they are admitted. If you have any questions or concerns, please don't hesitate to call.       Minor Kumar, DO

## 2022-07-02 NOTE — LETTER
[place] [address]  Phone: [phone]  Fax: [phone]             July 7, 2022    Patient: Mikaela James   YOB: 1980   Date of Visit: 7/2/2022       To Whom It May Concern:    Bertrand Brooks was seen and treated in our facility  beginning 7/2/2022 until 07/07/2022. She may return to work on 07/08/2022.       Sincerely,       Magnolia Krishna RN         Signature:__________________________________

## 2022-07-02 NOTE — ED PROVIDER NOTES
Spouse name: Not on file    Number of children: Not on file    Years of education: Not on file    Highest education level: Not on file   Occupational History    Not on file   Tobacco Use    Smoking status: Former Smoker    Smokeless tobacco: Not on file   Substance and Sexual Activity    Alcohol use: Yes     Comment: 7 shot a month    Drug use: Never    Sexual activity: Not on file   Other Topics Concern    Not on file   Social History Narrative    Not on file     Social Determinants of Health     Financial Resource Strain:     Difficulty of Paying Living Expenses: Not on file   Food Insecurity:     Worried About [employer] in the Last Year: Not on file    Ran Out of Food in the Last Year: Not on file   Transportation Needs:     Lack of Transportation (Medical): Not on file    Lack of Transportation (Non-Medical):  Not on file   Physical Activity:     Days of Exercise per Week: Not on file    Minutes of Exercise per Session: Not on file   Stress:     Feeling of Stress : Not on file   Social Connections:     Frequency of Communication with Friends and Family: Not on file    Frequency of Social Gatherings with Friends and Family: Not on file    Attends L[employer] Services: Not on file    Active Member of Clubs or Organizations: Not on file    Attends Club or Organization Meetings: Not on file    Marital Status: Not on file   Intimate Partner Violence:     Fear of Current or Ex-Partner: Not on file    Emotionally Abused: Not on file    Physically Abused: Not on file    Sexually Abused: Not on file   Housing Stability:     Unable to Pay for Housing in the Last Year: Not on file    Number of [place] in the Last Year: Not on file    Unstable Housing in the Last Year: Not on file     Current Facility-Administered Medications   Medication Dose Route Frequency Provider Last Rate Last Admin    morphine sulfate (PF) injection 4 mg  4 mg IntraVENous Q4H PRCARLOS Brooks DO   4 mg at 07/02/22 2121     Current Outpatient Medications   Medication Sig Dispense Refill    ondansetron (ZOFRAN ODT) 4 MG disintegrating tablet Take 1 tablet by mouth every 8 hours as needed for Nausea or Vomiting 15 tablet 0    butalbital-APAP-caffeine (FIORICET) -40 MG CAPS per capsule Take 1 capsule by mouth every 4 hours as needed for Headaches 84 capsule 0    naproxen (NAPROSYN) 500 MG tablet Take 1 tablet by mouth 2 times daily 60 tablet 0     Allergies   Allergen Reactions    Flexeril [Cyclobenzaprine] Swelling    Imitrex [Sumatriptan] Other (See Comments)     Does something to her nerves. Lyrica [Pregabalin] Other (See Comments)     Shuts her kidneys down       Nursing Notes Reviewed    Physical Exam:  Triage VS:    ED Triage Vitals [07/02/22 1914]   Enc Vitals Group      /88      Heart Rate (!) 112      Resp 22      Temp 99.1 °F (37.3 °C)      Temp Source Oral      SpO2 97 %      Weight 170 lb (77.1 kg)      Height 5' 3\" (1.6 m)      Head Circumference       Peak Flow       Pain Score       Pain Loc       Pain Edu? Excl. in [address]? My pulse ox interpretation is - normal    General appearance: (+) appears uncomfortable, aox3   Skin:  Warm. Dry. Eye:  Extraocular movements intact. Ears, nose, mouth and throat:  Oral mucosa moist   Neck:  Trachea midline. Extremity:  No swelling. Normal ROM     Heart:  Regular rate and rhythm, normal S1 & S2, no extra heart sounds. Perfusion:  intact  Respiratory:  Lungs clear to auscultation bilaterally. Respirations nonlabored. Abdominal:  Normal bowel sounds. Soft.  (+) diffusely ttp, b/l CVA ttp  Back:  (+) L sided healed linear scar  Neurological:  Alert and oriented times 3. No focal neuro deficits.              Psychiatric:  Appropriate    I have reviewed and interpreted all of the currently available lab results from this visit (if applicable):  Results for orders placed or performed during the hospital encounter of 32/19/69   Basic Metabolic Panel   Result Value Ref Range    Sodium 132 (L) 135 - 145 MMOL/L    Potassium 3.2 (L) 3.5 - 5.1 MMOL/L    Chloride 98 (L) 99 - 110 mMol/L    CO2 19 (L) 21 - 32 MMOL/L    Anion Gap 15 4 - 16    BUN 16 6 - 23 MG/DL    CREATININE 1.5 (H) 0.6 - 1.1 MG/DL    Glucose 143 (H) 70 - 99 MG/DL    Calcium 8.4 8.3 - 10.6 MG/DL    GFR Non- 38 (L) >60 mL/min/1.73m2    GFR  46 (L) >60 mL/min/1.73m2   SPECIMEN REJECTION   Result Value Ref Range    Rejected Test NO CBC PURP TOP     Reason for Rejection UNABLE TO PERFORM TESTING:    CBC with Auto Differential   Result Value Ref Range    WBC 27.2 (H) 4.0 - 10.5 K/CU MM    RBC 4.13 (L) 4.2 - 5.4 M/CU MM    Hemoglobin 11.6 (L) 12.5 - 16.0 GM/DL    Hematocrit 36.5 (L) 37 - 47 %    MCV 88.4 78 - 100 FL    MCH 28.1 27 - 31 PG    MCHC 31.8 (L) 32.0 - 36.0 %    RDW 13.8 11.7 - 14.9 %    Platelets 353 544 - 909 K/CU MM    MPV 12.1 (H) 7.5 - 11.1 FL    Differential Type AUTOMATED DIFFERENTIAL     Segs Relative 87.1 (H) 36 - 66 %    Lymphocytes % 3.2 (L) 24 - 44 %    Monocytes % 7.9 (H) 0 - 4 %    Eosinophils % 0.4 0 - 3 %    Basophils % 0.3 0 - 1 %    Segs Absolute 23.7 K/CU MM    Lymphocytes Absolute 0.9 K/CU MM    Monocytes Absolute 2.2 K/CU MM    Eosinophils Absolute 0.1 K/CU MM    Basophils Absolute 0.1 K/CU MM    Nucleated RBC % 0.0 %    Total Nucleated RBC 0.0 K/CU MM    Total Immature Neutrophil 0.29 K/CU MM    Immature Neutrophil % 1.1 (H) 0 - 0.43 %   Basic Metabolic Panel w/ Reflex to MG   Result Value Ref Range    Sodium 133 (L) 135 - 145 MMOL/L    Potassium 3.3 (L) 3.5 - 5.1 MMOL/L    Chloride 98 (L) 99 - 110 mMol/L    CO2 19 (L) 21 - 32 MMOL/L    Anion Gap 16 4 - 16    BUN 16 6 - 23 MG/DL    CREATININE 1.5 (H) 0.6 - 1.1 MG/DL    Glucose 130 (H) 70 - 99 MG/DL    Calcium 8.9 8.3 - 10.6 MG/DL    GFR Non- 38 (L) >60 mL/min/1.73m2    GFR  46 (L) >60 mL/min/1.73m2   Hepatic Function Panel   Result Value Ref Range    Albumin 4.1 3.4 - 5.0 GM/DL    Total Bilirubin 0.4 0.0 - 1.0 MG/DL    Bilirubin, Direct 0.2 0.0 - 0.3 MG/DL    Bilirubin, Indirect 0.2 0 - 0.7 MG/DL    Alkaline Phosphatase 72 40 - 129 IU/L    AST 20 15 - 37 IU/L    ALT 13 10 - 40 U/L    Total Protein 6.9 6.4 - 8.2 GM/DL   Lipase   Result Value Ref Range    Lipase 24 13 - 60 IU/L   Troponin   Result Value Ref Range    Troponin T <0.010 <0.01 NG/ML   Brain Natriuretic Peptide   Result Value Ref Range    Pro-.4 (H) <300 PG/ML   HCG, Quantitative, Pregnancy   Result Value Ref Range    hCG Quant 0.5 UIU/ML   Magnesium   Result Value Ref Range    Magnesium 1.7 (L) 1.8 - 2.4 mg/dl      Radiographs (if obtained):  Radiologist's Report Reviewed:  No results found. EKG (if obtained): (All EKG's are interpreted by myself in the absence of a cardiologist)      MDM:  44-year-old female with a past medical history of unknown congenital kidney disease and kidney stones. She comes in bilateral flank pain that feels like previous episodes. Unable to urinate when she did in the last few days it was painful. Concern for ureterolithiasis, septic kidney stones and pyelonephritis. Will obtain labs, urine, straight cath and CT abdomen pelvis. WBC significantly elevated. Cr 1.5 (elevated from baseline). Pending UA. Ceftriaxone ordered. CT abd/pelvis w/o contrast ordered due to decrease kidney function which revealed 3x 6mm obstructing L ureter stones. Urologist on call (dr. Candi Almanza) states he will take her to the OR in the morning and for her to remain NPO overnight. Pt to be admitted to hospitalist service. Pt and family member at bedside in agreement with the above plan. Pt signed out to Dr. Live Gill at ~11pm.       Clinical Impression:  1. Ureterolithiasis      Disposition referral (if applicable):  No follow-up provider specified.   Disposition medications (if applicable):  New Prescriptions    No medications on file     ED Provider Disposition Time  DISPOSITION Decision To Admit 07/02/2022 11:22:18 PM      Comment: Please note this report has been produced using speech recognition software and may contain errors related to that system including errors in grammar, punctuation, and spelling, as well as words and phrases that may be inappropriate. Efforts were made to edit the dictations.         Gemini Lepe, DO  07/02/22 4717

## 2022-07-03 ENCOUNTER — APPOINTMENT (OUTPATIENT)
Dept: GENERAL RADIOLOGY | Age: 42
DRG: 659 | End: 2022-07-03

## 2022-07-03 ENCOUNTER — ANESTHESIA (OUTPATIENT)
Dept: OPERATING ROOM | Age: 42
DRG: 659 | End: 2022-07-03

## 2022-07-03 ENCOUNTER — ANESTHESIA EVENT (OUTPATIENT)
Dept: OPERATING ROOM | Age: 42
DRG: 659 | End: 2022-07-03

## 2022-07-03 PROBLEM — R10.9 FLANK PAIN: Status: ACTIVE | Noted: 2022-07-03

## 2022-07-03 LAB
BACTERIA: ABNORMAL /HPF
BILIRUBIN URINE: NEGATIVE MG/DL
BLOOD, URINE: ABNORMAL
CLARITY: CLEAR
COLOR: YELLOW
GLUCOSE BLD-MCNC: 116 MG/DL (ref 70–99)
GLUCOSE, URINE: NEGATIVE MG/DL
HYALINE CASTS: 2 /LPF
KETONES, URINE: NEGATIVE MG/DL
LACTIC ACID, SEPSIS: 1.6 MMOL/L (ref 0.5–1.9)
LEUKOCYTE ESTERASE, URINE: NEGATIVE
MUCUS: ABNORMAL HPF
NITRITE URINE, QUANTITATIVE: NEGATIVE
PH, URINE: 6 (ref 5–8)
PROTEIN UA: ABNORMAL MG/DL
RBC URINE: 2 /HPF (ref 0–6)
SPECIFIC GRAVITY UA: 1.01 (ref 1–1.03)
SQUAMOUS EPITHELIAL: 2 /HPF
TRICHOMONAS: ABNORMAL /HPF
UROBILINOGEN, URINE: 0.2 MG/DL (ref 0.2–1)
WBC UA: 3 /HPF (ref 0–5)

## 2022-07-03 PROCEDURE — 3700000001 HC ADD 15 MINUTES (ANESTHESIA): Performed by: UROLOGY

## 2022-07-03 PROCEDURE — 6370000000 HC RX 637 (ALT 250 FOR IP): Performed by: INTERNAL MEDICINE

## 2022-07-03 PROCEDURE — 6370000000 HC RX 637 (ALT 250 FOR IP): Performed by: UROLOGY

## 2022-07-03 PROCEDURE — 87040 BLOOD CULTURE FOR BACTERIA: CPT

## 2022-07-03 PROCEDURE — 94150 VITAL CAPACITY TEST: CPT

## 2022-07-03 PROCEDURE — 3600000003 HC SURGERY LEVEL 3 BASE: Performed by: UROLOGY

## 2022-07-03 PROCEDURE — 76000 FLUOROSCOPY <1 HR PHYS/QHP: CPT

## 2022-07-03 PROCEDURE — 0T778DZ DILATION OF LEFT URETER WITH INTRALUMINAL DEVICE, VIA NATURAL OR ARTIFICIAL OPENING ENDOSCOPIC: ICD-10-PCS | Performed by: UROLOGY

## 2022-07-03 PROCEDURE — 6360000002 HC RX W HCPCS: Performed by: UROLOGY

## 2022-07-03 PROCEDURE — C1769 GUIDE WIRE: HCPCS | Performed by: UROLOGY

## 2022-07-03 PROCEDURE — 2580000003 HC RX 258: Performed by: UROLOGY

## 2022-07-03 PROCEDURE — 82962 GLUCOSE BLOOD TEST: CPT

## 2022-07-03 PROCEDURE — 7100000001 HC PACU RECOVERY - ADDTL 15 MIN: Performed by: UROLOGY

## 2022-07-03 PROCEDURE — C2617 STENT, NON-COR, TEM W/O DEL: HCPCS | Performed by: UROLOGY

## 2022-07-03 PROCEDURE — 3700000000 HC ANESTHESIA ATTENDED CARE: Performed by: UROLOGY

## 2022-07-03 PROCEDURE — 2709999900 HC NON-CHARGEABLE SUPPLY: Performed by: UROLOGY

## 2022-07-03 PROCEDURE — 6360000002 HC RX W HCPCS: Performed by: NURSE ANESTHETIST, CERTIFIED REGISTERED

## 2022-07-03 PROCEDURE — 6360000002 HC RX W HCPCS: Performed by: STUDENT IN AN ORGANIZED HEALTH CARE EDUCATION/TRAINING PROGRAM

## 2022-07-03 PROCEDURE — 74018 RADEX ABDOMEN 1 VIEW: CPT

## 2022-07-03 PROCEDURE — 2500000003 HC RX 250 WO HCPCS: Performed by: NURSE ANESTHETIST, CERTIFIED REGISTERED

## 2022-07-03 PROCEDURE — BT1F1ZZ FLUOROSCOPY OF LEFT KIDNEY, URETER AND BLADDER USING LOW OSMOLAR CONTRAST: ICD-10-PCS | Performed by: UROLOGY

## 2022-07-03 PROCEDURE — 6360000002 HC RX W HCPCS: Performed by: INTERNAL MEDICINE

## 2022-07-03 PROCEDURE — 3600000013 HC SURGERY LEVEL 3 ADDTL 15MIN: Performed by: UROLOGY

## 2022-07-03 PROCEDURE — 94761 N-INVAS EAR/PLS OXIMETRY MLT: CPT

## 2022-07-03 PROCEDURE — 2580000003 HC RX 258: Performed by: INTERNAL MEDICINE

## 2022-07-03 PROCEDURE — 7100000000 HC PACU RECOVERY - FIRST 15 MIN: Performed by: UROLOGY

## 2022-07-03 PROCEDURE — 1200000000 HC SEMI PRIVATE

## 2022-07-03 PROCEDURE — 6360000002 HC RX W HCPCS: Performed by: ANESTHESIOLOGY

## 2022-07-03 PROCEDURE — 83605 ASSAY OF LACTIC ACID: CPT

## 2022-07-03 DEVICE — VARIABLE LENGTH URETERAL STENT
Type: IMPLANTABLE DEVICE | Site: URETER | Status: FUNCTIONAL
Brand: CONTOUR VL™

## 2022-07-03 RX ORDER — ENOXAPARIN SODIUM 100 MG/ML
40 INJECTION SUBCUTANEOUS DAILY
Status: DISCONTINUED | OUTPATIENT
Start: 2022-07-03 | End: 2022-07-03

## 2022-07-03 RX ORDER — SODIUM CHLORIDE 9 MG/ML
25 INJECTION, SOLUTION INTRAVENOUS PRN
Status: DISCONTINUED | OUTPATIENT
Start: 2022-07-03 | End: 2022-07-03 | Stop reason: HOSPADM

## 2022-07-03 RX ORDER — MEPERIDINE HYDROCHLORIDE 25 MG/ML
12.5 INJECTION INTRAMUSCULAR; INTRAVENOUS; SUBCUTANEOUS ONCE
Status: DISCONTINUED | OUTPATIENT
Start: 2022-07-03 | End: 2022-07-03 | Stop reason: HOSPADM

## 2022-07-03 RX ORDER — SODIUM CHLORIDE 0.9 % (FLUSH) 0.9 %
10 SYRINGE (ML) INJECTION PRN
Status: DISCONTINUED | OUTPATIENT
Start: 2022-07-03 | End: 2022-07-07 | Stop reason: HOSPADM

## 2022-07-03 RX ORDER — LORAZEPAM 2 MG/ML
0.5 INJECTION INTRAMUSCULAR
Status: DISCONTINUED | OUTPATIENT
Start: 2022-07-03 | End: 2022-07-03 | Stop reason: HOSPADM

## 2022-07-03 RX ORDER — ONDANSETRON 2 MG/ML
INJECTION INTRAMUSCULAR; INTRAVENOUS PRN
Status: DISCONTINUED | OUTPATIENT
Start: 2022-07-03 | End: 2022-07-03 | Stop reason: SDUPTHER

## 2022-07-03 RX ORDER — HYDRALAZINE HYDROCHLORIDE 20 MG/ML
10 INJECTION INTRAMUSCULAR; INTRAVENOUS
Status: DISCONTINUED | OUTPATIENT
Start: 2022-07-03 | End: 2022-07-03 | Stop reason: HOSPADM

## 2022-07-03 RX ORDER — ONDANSETRON 4 MG/1
4 TABLET, ORALLY DISINTEGRATING ORAL EVERY 8 HOURS PRN
Status: DISCONTINUED | OUTPATIENT
Start: 2022-07-03 | End: 2022-07-07 | Stop reason: HOSPADM

## 2022-07-03 RX ORDER — OXYCODONE HYDROCHLORIDE 5 MG/1
5 TABLET ORAL
Status: DISCONTINUED | OUTPATIENT
Start: 2022-07-03 | End: 2022-07-03 | Stop reason: HOSPADM

## 2022-07-03 RX ORDER — ONDANSETRON 2 MG/ML
4 INJECTION INTRAMUSCULAR; INTRAVENOUS
Status: DISCONTINUED | OUTPATIENT
Start: 2022-07-03 | End: 2022-07-03 | Stop reason: HOSPADM

## 2022-07-03 RX ORDER — PROCHLORPERAZINE EDISYLATE 5 MG/ML
5 INJECTION INTRAMUSCULAR; INTRAVENOUS
Status: DISCONTINUED | OUTPATIENT
Start: 2022-07-03 | End: 2022-07-03 | Stop reason: HOSPADM

## 2022-07-03 RX ORDER — DEXAMETHASONE SODIUM PHOSPHATE 4 MG/ML
INJECTION, SOLUTION INTRA-ARTICULAR; INTRALESIONAL; INTRAMUSCULAR; INTRAVENOUS; SOFT TISSUE PRN
Status: DISCONTINUED | OUTPATIENT
Start: 2022-07-03 | End: 2022-07-03 | Stop reason: SDUPTHER

## 2022-07-03 RX ORDER — SODIUM CHLORIDE 0.9 % (FLUSH) 0.9 %
10 SYRINGE (ML) INJECTION EVERY 12 HOURS SCHEDULED
Status: DISCONTINUED | OUTPATIENT
Start: 2022-07-03 | End: 2022-07-07 | Stop reason: HOSPADM

## 2022-07-03 RX ORDER — ONDANSETRON 2 MG/ML
4 INJECTION INTRAMUSCULAR; INTRAVENOUS EVERY 6 HOURS PRN
Status: DISCONTINUED | OUTPATIENT
Start: 2022-07-03 | End: 2022-07-07 | Stop reason: HOSPADM

## 2022-07-03 RX ORDER — FENTANYL CITRATE 50 UG/ML
INJECTION, SOLUTION INTRAMUSCULAR; INTRAVENOUS PRN
Status: DISCONTINUED | OUTPATIENT
Start: 2022-07-03 | End: 2022-07-03 | Stop reason: SDUPTHER

## 2022-07-03 RX ORDER — SODIUM CHLORIDE 9 MG/ML
INJECTION, SOLUTION INTRAVENOUS CONTINUOUS
Status: DISCONTINUED | OUTPATIENT
Start: 2022-07-03 | End: 2022-07-06

## 2022-07-03 RX ORDER — DEXTROSE MONOHYDRATE 50 MG/ML
100 INJECTION, SOLUTION INTRAVENOUS PRN
Status: DISCONTINUED | OUTPATIENT
Start: 2022-07-03 | End: 2022-07-03 | Stop reason: HOSPADM

## 2022-07-03 RX ORDER — BUTALBITAL, ACETAMINOPHEN AND CAFFEINE 50; 325; 40 MG/1; MG/1; MG/1
1 TABLET ORAL
Status: COMPLETED | OUTPATIENT
Start: 2022-07-04 | End: 2022-07-04

## 2022-07-03 RX ORDER — DIPHENHYDRAMINE HYDROCHLORIDE 50 MG/ML
12.5 INJECTION INTRAMUSCULAR; INTRAVENOUS
Status: DISCONTINUED | OUTPATIENT
Start: 2022-07-03 | End: 2022-07-03 | Stop reason: HOSPADM

## 2022-07-03 RX ORDER — OXYCODONE HYDROCHLORIDE AND ACETAMINOPHEN 5; 325 MG/1; MG/1
1 TABLET ORAL EVERY 8 HOURS PRN
Status: DISCONTINUED | OUTPATIENT
Start: 2022-07-03 | End: 2022-07-07 | Stop reason: HOSPADM

## 2022-07-03 RX ORDER — SODIUM CHLORIDE 0.9 % (FLUSH) 0.9 %
5-40 SYRINGE (ML) INJECTION PRN
Status: DISCONTINUED | OUTPATIENT
Start: 2022-07-03 | End: 2022-07-03 | Stop reason: HOSPADM

## 2022-07-03 RX ORDER — PROPOFOL 10 MG/ML
INJECTION, EMULSION INTRAVENOUS PRN
Status: DISCONTINUED | OUTPATIENT
Start: 2022-07-03 | End: 2022-07-03 | Stop reason: SDUPTHER

## 2022-07-03 RX ORDER — HEPARIN SODIUM 5000 [USP'U]/ML
5000 INJECTION, SOLUTION INTRAVENOUS; SUBCUTANEOUS EVERY 8 HOURS SCHEDULED
Status: DISCONTINUED | OUTPATIENT
Start: 2022-07-03 | End: 2022-07-07 | Stop reason: HOSPADM

## 2022-07-03 RX ORDER — LIDOCAINE HYDROCHLORIDE 20 MG/ML
INJECTION, SOLUTION EPIDURAL; INFILTRATION; INTRACAUDAL; PERINEURAL PRN
Status: DISCONTINUED | OUTPATIENT
Start: 2022-07-03 | End: 2022-07-03 | Stop reason: SDUPTHER

## 2022-07-03 RX ORDER — ACETAMINOPHEN 650 MG/1
650 SUPPOSITORY RECTAL EVERY 6 HOURS PRN
Status: DISCONTINUED | OUTPATIENT
Start: 2022-07-03 | End: 2022-07-07 | Stop reason: HOSPADM

## 2022-07-03 RX ORDER — SODIUM CHLORIDE, SODIUM LACTATE, POTASSIUM CHLORIDE, CALCIUM CHLORIDE 600; 310; 30; 20 MG/100ML; MG/100ML; MG/100ML; MG/100ML
INJECTION, SOLUTION INTRAVENOUS CONTINUOUS
Status: DISCONTINUED | OUTPATIENT
Start: 2022-07-03 | End: 2022-07-03

## 2022-07-03 RX ORDER — SODIUM CHLORIDE 0.9 % (FLUSH) 0.9 %
5-40 SYRINGE (ML) INJECTION EVERY 12 HOURS SCHEDULED
Status: DISCONTINUED | OUTPATIENT
Start: 2022-07-03 | End: 2022-07-03 | Stop reason: HOSPADM

## 2022-07-03 RX ORDER — FENTANYL CITRATE 50 UG/ML
50 INJECTION, SOLUTION INTRAMUSCULAR; INTRAVENOUS EVERY 5 MIN PRN
Status: DISCONTINUED | OUTPATIENT
Start: 2022-07-03 | End: 2022-07-03 | Stop reason: HOSPADM

## 2022-07-03 RX ORDER — IPRATROPIUM BROMIDE AND ALBUTEROL SULFATE 2.5; .5 MG/3ML; MG/3ML
1 SOLUTION RESPIRATORY (INHALATION)
Status: DISCONTINUED | OUTPATIENT
Start: 2022-07-03 | End: 2022-07-03 | Stop reason: HOSPADM

## 2022-07-03 RX ORDER — ACETAMINOPHEN 325 MG/1
650 TABLET ORAL EVERY 6 HOURS PRN
Status: DISCONTINUED | OUTPATIENT
Start: 2022-07-03 | End: 2022-07-07 | Stop reason: HOSPADM

## 2022-07-03 RX ORDER — SODIUM CHLORIDE 9 MG/ML
INJECTION, SOLUTION INTRAVENOUS PRN
Status: DISCONTINUED | OUTPATIENT
Start: 2022-07-03 | End: 2022-07-07 | Stop reason: HOSPADM

## 2022-07-03 RX ADMIN — HYDROMORPHONE HYDROCHLORIDE 1 MG: 1 INJECTION, SOLUTION INTRAMUSCULAR; INTRAVENOUS; SUBCUTANEOUS at 08:24

## 2022-07-03 RX ADMIN — ACETAMINOPHEN 650 MG: 325 TABLET ORAL at 17:27

## 2022-07-03 RX ADMIN — SODIUM CHLORIDE: 9 INJECTION, SOLUTION INTRAVENOUS at 15:19

## 2022-07-03 RX ADMIN — LIDOCAINE HYDROCHLORIDE 100 MG: 20 INJECTION, SOLUTION EPIDURAL; INFILTRATION; INTRACAUDAL; PERINEURAL at 09:09

## 2022-07-03 RX ADMIN — ACETAMINOPHEN 650 MG: 325 TABLET ORAL at 23:16

## 2022-07-03 RX ADMIN — OXYCODONE AND ACETAMINOPHEN 1 TABLET: 5; 325 TABLET ORAL at 11:59

## 2022-07-03 RX ADMIN — HEPARIN SODIUM 5000 UNITS: 5000 INJECTION INTRAVENOUS; SUBCUTANEOUS at 13:03

## 2022-07-03 RX ADMIN — ONDANSETRON 4 MG: 2 INJECTION INTRAMUSCULAR; INTRAVENOUS at 09:13

## 2022-07-03 RX ADMIN — PROPOFOL 200 MG: 10 INJECTION, EMULSION INTRAVENOUS at 09:09

## 2022-07-03 RX ADMIN — SODIUM CHLORIDE: 9 INJECTION, SOLUTION INTRAVENOUS at 09:14

## 2022-07-03 RX ADMIN — OXYCODONE AND ACETAMINOPHEN 1 TABLET: 5; 325 TABLET ORAL at 20:31

## 2022-07-03 RX ADMIN — CEFTRIAXONE SODIUM 1000 MG: 1 INJECTION, POWDER, FOR SOLUTION INTRAMUSCULAR; INTRAVENOUS at 21:08

## 2022-07-03 RX ADMIN — MORPHINE SULFATE 4 MG: 4 INJECTION, SOLUTION INTRAMUSCULAR; INTRAVENOUS at 01:38

## 2022-07-03 RX ADMIN — FENTANYL CITRATE 100 MCG: 50 INJECTION, SOLUTION INTRAMUSCULAR; INTRAVENOUS at 09:05

## 2022-07-03 RX ADMIN — ONDANSETRON 4 MG: 2 INJECTION INTRAMUSCULAR; INTRAVENOUS at 03:55

## 2022-07-03 RX ADMIN — OXYCODONE AND ACETAMINOPHEN 1 TABLET: 5; 325 TABLET ORAL at 03:55

## 2022-07-03 RX ADMIN — SODIUM CHLORIDE: 9 INJECTION, SOLUTION INTRAVENOUS at 02:58

## 2022-07-03 RX ADMIN — DEXAMETHASONE SODIUM PHOSPHATE 4 MG: 4 INJECTION, SOLUTION INTRAMUSCULAR; INTRAVENOUS at 09:13

## 2022-07-03 RX ADMIN — HEPARIN SODIUM 5000 UNITS: 5000 INJECTION INTRAVENOUS; SUBCUTANEOUS at 21:40

## 2022-07-03 ASSESSMENT — PAIN DESCRIPTION - DESCRIPTORS
DESCRIPTORS: PRESSURE
DESCRIPTORS: SHARP
DESCRIPTORS: SORE
DESCRIPTORS: SORE;DISCOMFORT
DESCRIPTORS: SORE
DESCRIPTORS: SPASM
DESCRIPTORS: SPASM
DESCRIPTORS: PRESSURE
DESCRIPTORS: SORE
DESCRIPTORS: SHARP;SHOOTING

## 2022-07-03 ASSESSMENT — PAIN SCALES - GENERAL
PAINLEVEL_OUTOF10: 9
PAINLEVEL_OUTOF10: 7
PAINLEVEL_OUTOF10: 8
PAINLEVEL_OUTOF10: 6
PAINLEVEL_OUTOF10: 2
PAINLEVEL_OUTOF10: 10
PAINLEVEL_OUTOF10: 0
PAINLEVEL_OUTOF10: 10
PAINLEVEL_OUTOF10: 0
PAINLEVEL_OUTOF10: 3
PAINLEVEL_OUTOF10: 5
PAINLEVEL_OUTOF10: 6
PAINLEVEL_OUTOF10: 8
PAINLEVEL_OUTOF10: 5
PAINLEVEL_OUTOF10: 2
PAINLEVEL_OUTOF10: 4
PAINLEVEL_OUTOF10: 8

## 2022-07-03 ASSESSMENT — PAIN DESCRIPTION - ONSET
ONSET: ON-GOING

## 2022-07-03 ASSESSMENT — PAIN DESCRIPTION - PAIN TYPE
TYPE: SURGICAL PAIN
TYPE: ACUTE PAIN
TYPE: SURGICAL PAIN
TYPE: SURGICAL PAIN
TYPE: ACUTE PAIN;SURGICAL PAIN
TYPE: SURGICAL PAIN

## 2022-07-03 ASSESSMENT — PAIN DESCRIPTION - FREQUENCY
FREQUENCY: CONTINUOUS

## 2022-07-03 ASSESSMENT — PAIN DESCRIPTION - LOCATION
LOCATION: FLANK
LOCATION: BACK
LOCATION: FLANK
LOCATION: HEAD

## 2022-07-03 ASSESSMENT — PAIN - FUNCTIONAL ASSESSMENT

## 2022-07-03 ASSESSMENT — PAIN DESCRIPTION - ORIENTATION
ORIENTATION: LOWER
ORIENTATION: LEFT
ORIENTATION: LEFT
ORIENTATION: RIGHT;LEFT
ORIENTATION: RIGHT;LEFT
ORIENTATION: LEFT
ORIENTATION: LEFT
ORIENTATION: RIGHT;LEFT
ORIENTATION: LEFT
ORIENTATION: RIGHT;LEFT

## 2022-07-03 ASSESSMENT — PAIN DESCRIPTION - DIRECTION
RADIATING_TOWARDS: PELVIS
RADIATING_TOWARDS: DOWN

## 2022-07-03 ASSESSMENT — LIFESTYLE VARIABLES
HOW MANY STANDARD DRINKS CONTAINING ALCOHOL DO YOU HAVE ON A TYPICAL DAY: 7 TO 9
HOW OFTEN DO YOU HAVE A DRINK CONTAINING ALCOHOL: MONTHLY OR LESS

## 2022-07-03 NOTE — OP NOTE
[address] [facility] [address]     Operative Note  [facility]    Patient: Bennett Valerio    Date: 7/3/2022  : 1980     DOA: 2022   MRN: [id number]    [facility] [id number]  ROOM#: OR/NONE       Pre-operative Diagnosis: Left ureteral stone, possible UTI    Post-operative Diagnosis: same    Procedure: Cystoscopy, left retrograde pyelogram/stent insertion    Surgeons/Assistants: Frank Mirza    Anesthesia: General LMA anesthesia    EBL: Minimal    Complications: none    Specimens: were not obtained    Indication for Procedure:      Bennett Valerio is a 43 y.o.  female with history of ureterolithiasis. Imaging suggested three left ureteral calculi each of which measured ~ 5-6 mm. The patient was unable to get pain relief and unable to pass calculus with possible UTI, so Bennett Valerio was brought to the OR today for cystourethroscopy, left retrograde pyelogram, left ureteral stent insertion. Risks and benefits were explained & Bennett Valerio agreed to proceed as planned. Description of procedure: The patient was identified in the holding area, taken to procedure room, and placed in supine position. Anesthesia was administered. The patient was then placed in the dorsal lithotomy position, sterilely prepped and then draped in the usual fashion. Time out was taken to ensure this was the proper operation, for the proper patient, on the proper side; everyone in the room agreed. Rigid cystoscopy ensued. A sensor wire was then placed into the patient's left renal pelvis. Purulent urine drained. Using the sensor wire I passed up a 6 [place] variable length double J stent with a cystoscope. It was noted to be in good position proximally fluoroscopically and distally cystoscopically. The patient's bladder was drained with a catheter. Lisa Xiong tolerated the procedure well & without difficulty and was then transferred to PACU to recover.        Bennett Valerio will need to have intervention of the stones as an outpatient once the urine is deemed to be sterile. This may be via ureteroscopy/laser stone manipulation or ESWL.     KUB is ordered to determine if the stones are radio-opaque.       Christianne Braxton MD  Electronically signed at 7/3/2022

## 2022-07-03 NOTE — PROGRESS NOTES
4 Eyes Skin Assessment     NAME:  Srikanth Chun  YOB: 1980  MEDICAL RECORD NUMBER:  [id number]    The patient is being assess for  Admission    I agree that 2 RN's have performed a thorough Head to Toe Skin Assessment on the patient. ALL assessment sites listed below have been assessed. Areas assessed by both nurses:    Head, Face, Ears, Shoulders, Back, Chest, Arms, Elbows, Hands, Sacrum. Buttock, Coccyx, Ischium and Legs. Feet and Heels        Does the Patient have a Wound?  No noted wound(s)       Wero Prevention initiated:  No   Wound Care Orders initiated:  No    Pressure Injury (Stage 3,4, Unstageable, DTI, NWPT, and Complex wounds) if present place referral/consult order under [de-identified] No    New and Established Ostomies if present place consult order under : No      Nurse 1 eSignature: Electronically signed by Cindy Dupont LPN on 9/9/44 at 0:56 AM EDT    **SHARE this note so that the co-signing nurse is able to place an eSignature**    Nurse 2 eSignature: Electronically signed by Aurelio Colin RN on 7/3/22 at 3:44 AM EDT

## 2022-07-03 NOTE — ANESTHESIA POSTPROCEDURE EVALUATION
Department of Anesthesiology  Postprocedure Note    Patient: Abdelrahman Graham  MRN: [id number]  YOB: 1980  Date of evaluation: 7/3/2022      Procedure Summary     Date: 07/03/22 Room / Location: [address]    Anesthesia Start: 0900 Anesthesia Stop: 0224    Procedure: CYSTOSCOPY RETROGRADE PYLEOGRAM WITH STENT INSERTION (Left Ureter) Diagnosis:       Nephroureterolithiasis      (Nephroureterolithiasis [N20.2])    Surgeons: Bolivar English MD Responsible Provider: Maddi Spencer MD    Anesthesia Type: General ASA Status: 2          Anesthesia Type: General    Regis Phase I:      Regis Phase II:        Anesthesia Post Evaluation    Patient location during evaluation: PACU  Patient participation: complete - patient participated  Level of consciousness: awake and alert  Pain score: 0  Airway patency: patent  Nausea & Vomiting: no nausea and no vomiting  Complications: no  Cardiovascular status: blood pressure returned to baseline and hemodynamically stable  Respiratory status: acceptable, spontaneous ventilation, nonlabored ventilation and face mask  Hydration status: stable

## 2022-07-03 NOTE — H&P
History and Physical      Name:  Francesco Blackmon /Age/Sex: 1980  (43 y.o. female)   MRN & CSN:  [id number] & [id number] Encounter Date/Time: 7/3/2022 12:02 AM EDT   Location:  [facility] PCP: Marcos Powers MD       Hospital Day: 2    Assessment and Plan:   Francesco Blackmon is a 43 y.o. female who presents with     B/L flank pain, d/t nephrolithiasis  - CT with 3 distal left, ureteral calculi  3 distal left ureteral calculi  each measuring 5-6 mm   urology consult  - NPO  - IVF  - pain mgmt  - antiemetic    Leukocytosis  States that she always has elevated white count with her kidney stone pain  Continue to monitor, unsure of UTI, however urologist recommends continuing Rocephin  No obvious source of infection otherwise    Chronic hep C, not on any medications      D/w ED provider  Code status Full  DVT PPx Lovenox       History of Present Illness:     Francesco Blackmon is a 43 y.o. female p/w bilateral flank pain, more so on the left side, states that she had difficulty urinating, was taking cranberry juice at home thinking that would reverse his symptoms however did not. Patient states that she does not have any burning during urination although she did mention burning urination to the ED provider. Patient states that she does has hesitancy. She does admit to having elevated white cell count when she does have kidney stone problems. Patient denies any fevers, or chills. Review of Systems: Need 10 Elements       Pt otherwise has no complaints of CP, SOB, dizziness, N/V/C/D, abdominal pain, joint pains, rash/boils, cough or fevers.        Objective:   No intake or output data in the 24 hours ending 22 0002   Vitals:   Vitals:    22 1914 22   BP: 136/88 (!) 132/102   Pulse: (!) 112 (!) 115   Resp: 22 20   Temp: 99.1 °F (37.3 °C) 99.2 °F (37.3 °C)   TempSrc: Oral Oral   SpO2: 97% 98%   Weight: 170 lb (77.1 kg) 170 lb (77.1 kg)   Height: 5' 3\" (1.6 m) 5' 3\" (1.6 m) Medications Prior to Admission     Prior to Admission medications    Medication Sig Start Date End Date Taking? Authorizing Provider   ondansetron (ZOFRAN ODT) 4 MG disintegrating tablet Take 1 tablet by mouth every 8 hours as needed for Nausea or Vomiting 3/8/22   Lissette Friedman PA-C   butalbital-APAP-caffeine (FIORICET) -40 MG CAPS per capsule Take 1 capsule by mouth every 4 hours as needed for Headaches 7/20/20   Cathryne Beams, DO   naproxen (NAPROSYN) 500 MG tablet Take 1 tablet by mouth 2 times daily 7/20/20   Cathryne Beams, DO       Physical Exam: Need 8 Elements   General: NAD   Eyes: EOMI  ENT: neck supple  Cardiovascular: Regular rate. NO edema  Respiratory: Symmetrical expansion,   Gastrointestinal: Soft, non tender  Genitourinary: no suprapubic tenderness, left flank pain, tenderness palpation  Skin: warm, dry  Neuro: Alert. Oriented  Psych: Mood appropriate. Past Medical History:   PMHx   Past Medical History:   Diagnosis Date    Arthritis     Chronic kidney disease     Diabetes mellitus ([facility] [address]     Hypertension     Kidney stone      PSHX:  has no past surgical history on file. Allergies: Allergies   Allergen Reactions    Flexeril [Cyclobenzaprine] Swelling    Imitrex [Sumatriptan] Other (See Comments)     Does something to her nerves.  Lyrica [Pregabalin] Other (See Comments)     Shuts her kidneys down     Fam HX:  family history is not on file.   Soc HX:   Social History     Socioeconomic History    Marital status: Single     Spouse name: None    Number of children: None    Years of education: None    Highest education level: None   Occupational History    None   Tobacco Use    Smoking status: Former Smoker    Smokeless tobacco: None   Substance and Sexual Activity    Alcohol use: Yes     Comment: 7 shot a month    Drug use: Never    Sexual activity: None   Other Topics Concern    None   Social History Narrative    None     Social Determinants of Health Financial Resource Strain:     Difficulty of Paying Living Expenses: Not on file   Food Insecurity:     Worried About Running Out of Food in the Last Year: Not on file    Valentin of Food in the Last Year: Not on file   Transportation Needs:     Lack of Transportation (Medical): Not on file    Lack of Transportation (Non-Medical):  Not on file   Physical Activity:     Days of Exercise per Week: Not on file    Minutes of Exercise per Session: Not on file   Stress:     Feeling of Stress : Not on file   Social Connections:     Frequency of Communication with Friends and Family: Not on file    Frequency of Social Gatherings with Friends and Family: Not on file    Attends Worship Services: Not on file    Active Member of [address] [place] [address] [employer] or Organizations: Not on file    Attends Club or Organization Meetings: Not on file    Marital Status: Not on file   Intimate Partner Violence:     Fear of Current or Ex-Partner: Not on file    Emotionally Abused: Not on file    Physically Abused: Not on file    Sexually Abused: Not on file   Housing Stability:     Unable to Pay for Housing in the Last Year: Not on file    Number of [place] in the Last Year: Not on file    Unstable Housing in the Last Year: Not on file       Medications:   Medications:    Infusions:   PRN Meds: morphine, 4 mg, Q4H PRN        Labs      CBC:   Recent Labs     07/02/22 1951   WBC 27.2*   HGB 11.6*        BMP:    Recent Labs     07/02/22 1919 07/02/22 1951   * 133*   K 3.2* 3.3*   CL 98* 98*   CO2 19* 19*   BUN 16 16   CREATININE 1.5* 1.5*   GLUCOSE 143* 130*     Hepatic:   Recent Labs     07/02/22 1951   AST 20   ALT 13   BILITOT 0.4   ALKPHOS 72     Lipids: No results found for: CHOL, HDL, TRIG  Hemoglobin A1C: No results found for: LABA1C  TSH: No results found for: TSH  Troponin:   Lab Results   Component Value Date/Time    TROPONINT <0.010 07/02/2022 07:51 PM    TROPONINT <0.010 03/08/2022 12:48 PM     Lactic Acid: No results for input(s): LACTA in the last 72 hours. BNP:   Recent Labs     07/02/22  1951   PROBNP 975.4*     UA:  Lab Results   Component Value Date/Time    NITRU NEGATIVE 07/02/2022 11:05 PM    COLORU YELLOW 07/02/2022 11:05 PM    WBCUA 43 03/08/2022 12:50 PM    RBCUA 3 03/08/2022 12:50 PM    MUCUS RARE 03/08/2022 12:50 PM    TRICHOMONAS NONE SEEN 02/02/2021 01:41 PM    BACTERIA OCCASIONAL 03/08/2022 12:50 PM    CLARITYU CLEAR 07/02/2022 11:05 PM    SPECGRAV 1.015 07/02/2022 11:05 PM    LEUKOCYTESUR NEGATIVE 07/02/2022 11:05 PM    UROBILINOGEN 0.2 07/02/2022 11:05 PM    BILIRUBINUR NEGATIVE 07/02/2022 11:05 PM    BLOODU SMALL 07/02/2022 11:05 PM    KETUA NEGATIVE 07/02/2022 11:05 PM     Urine Cultures: No results found for: LABURIN  Blood Cultures: No results found for: BC  No results found for: BLOODCULT2  Organism: No results found for: ORG    Imaging/Diagnostics Last 24 Hours   CT ABDOMEN PELVIS WO CONTRAST Additional Contrast? None    Result Date: 7/2/2022  EXAMINATION: STONE PROTOCOL CT OF THE ABDOMEN AND PELVIS 7/2/2022 9:39 pm TECHNIQUE: CT of the abdomen and pelvis was performed without the administration of intravenous contrast. Multiplanar reformatted images are provided for review. Automated exposure control, iterative reconstruction, and/or weight based adjustment of the mA/kV was utilized to reduce the radiation dose to as low as reasonably achievable. COMPARISON: None. HISTORY: ORDERING SYSTEM PROVIDED HISTORY: abdominal pain, urinary retention TECHNOLOGIST PROVIDED HISTORY: Reason for exam:->abdominal pain, urinary retention Additional Contrast?->None Is the patient pregnant?->No Reason for Exam: abdominal pain, urinary retention FINDINGS: LOWER CHEST: There is mild bibasilar airspace disease.  KIDNEYS AND URINARY TRACT: There is moderate left hydronephrosis and hydroureter secondary to 3 obstructing calculi in the distal left ureter each measuring approximately 5-6 mm.  1 calculus is perched at the left UVJ (axial image [id number]). The left kidney is enlarged and there is prominent left perinephric and periureteral fat stranding. No other renal calculi. ORGANS: Visualized portions of the unenhanced liver, spleen, pancreas, and adrenal glands demonstrate no acute abnormality. There are no calcified gallstones. No inflammatory changes in the visualized portion of the gallbladder fossa. GI/BOWEL: The appendix is normal.  Unopacified bowel loops are unremarkable. No bowel obstruction. PELVIS: There is a Loera catheter coiled in the vagina including the inflated balloon tip. The uterus and ovaries are unremarkable. PERITONEUM/RETROPERITONEUM: There is left retroperitoneal fat stranding. There is a small volume of free fluid in the cul-de-sac. There is no free air or adenopathy. BONES/SOFT TISSUES: The osseous structures demonstrate no acute abnormality. Acute left obstructive uropathy secondary to 3 distal left ureteral calculi each measuring 5-6 mm.  1 calculus is perched at the left UVJ. The Loera catheter is malpositioned with the tip coiled in the vagina. Mild bibasilar airspace disease, likely atelectasis. Pneumonia not excluded. Findings were discussed with Eve Fierro at 10:49 pm on 7/2/2022. XR CHEST PORTABLE    Result Date: 7/2/2022  EXAMINATION: ONE XRAY VIEW OF THE CHEST 7/2/2022 8:03 pm COMPARISON: March 8, 2022. HISTORY: ORDERING SYSTEM PROVIDED HISTORY: elevated BNP TECHNOLOGIST PROVIDED HISTORY: Reason for exam:->elevated BNP Reason for Exam: elevated BNP Additional signs and symptoms: bilateral flank pain FINDINGS: A portable upright frontal view chest radiograph was obtained. The heart size, mediastinal contour and pleural spaces are within normal limits. Minimal atelectasis is present at the left lung base. The lungs are otherwise clear. There is no focal consolidation or pneumothorax. The pulmonary vascular pattern is within normal limits.   No significant thoracic osseous abnormality. Minimal left basilar atelectasis. Otherwise, clear lungs.            Electronically signed by Bridgette Reyes MD on 7/3/2022 at 12:02 AM

## 2022-07-03 NOTE — PROGRESS NOTES
Subjective: The patient is a 77-year-old female with history of chronic hepatitis C who was admitted with left-sided flank pain.   CT abdomen showed distal left ureteral calculi, urology consulted, IV fluids  Ceftriaxone started        Vitals: Afebrile, heart rate 84, sinus tachycardia on admission, blood pressures 122/92, on room air      Home medications: Fioricet as needed, naproxen as needed, Zofran as needed      Current Medications: Ceftriaxone, Lovenox, normal saline at 100 mm/h      Labs: Sodium 133, potassium 3.3, creatinine 1.5, mag 1.7  WBC 27.2, hemoglobin 11.6, platelets 167  Urine negative for cystitis    Imaging: CT renal stone protocol showed acute left obstructive uropathy secondary to 3 distal left ureteral calculi each measuring 5.6 mm, one calculus is post at left UV junction      Plan:   Obstructive uropathy secondary to ureteral lithiasis  Acute kidney injury  History of hepatitis C      Admitted with leukocytosis, sinus tachycardia, obstructing calculus  Creatinine elevated  IV fluids, add Flomax, avoid NSAIDs  Switch Lovenox to heparin  Urology consulted  Continue ceftriaxone  Pain control  Check morning labs  Patient admitted this morning we will continue to follow from tomorrow onwards

## 2022-07-03 NOTE — ANESTHESIA PRE PROCEDURE
Department of Anesthesiology  Preprocedure Note       Name:  Diego Howard   Age:  43 y.o.  :  1980                                          MRN:  [id number]         Date:  7/3/2022      Surgeon: Jeri Valencia):  Sharee Fraser MD    Procedure: Procedure(s):  CYSTOSCOPY RETROGRADE PYELOGRAM URETERAL STENT INSERTION    Medications prior to admission:   Prior to Admission medications    Medication Sig Start Date End Date Taking?  Authorizing Provider   ondansetron (ZOFRAN ODT) 4 MG disintegrating tablet Take 1 tablet by mouth every 8 hours as needed for Nausea or Vomiting 3/8/22   Fannie Gloria PA-C   butalbital-APAP-caffeine (FIORICET) -40 MG CAPS per capsule Take 1 capsule by mouth every 4 hours as needed for Headaches 20   Reatha Kanner, DO   naproxen (NAPROSYN) 500 MG tablet Take 1 tablet by mouth 2 times daily 20   Reatha Kanner, DO       Current medications:    Current Facility-Administered Medications   Medication Dose Route Frequency Provider Last Rate Last Admin    cefTRIAXone (ROCEPHIN) 1000 mg IVPB in 50 mL D5W minibag  1,000 mg IntraVENous Q24H Ariel Elen Ansari MD        oxyCODONE-acetaminophen (PERCOCET) 5-325 MG per tablet 1 tablet  1 tablet Oral Q8H PRN Ariel Elen Ansari MD   1 tablet at 22 0355    sodium chloride flush 0.9 % injection 10 mL  10 mL IntraVENous 2 times per day Hank Carroll MD        sodium chloride flush 0.9 % injection 10 mL  10 mL IntraVENous PRN Ariel Elen Ansari MD        0.9 % sodium chloride infusion   IntraVENous PRN Ariel Elen Ansari MD        ondansetron (ZOFRAN-ODT) disintegrating tablet 4 mg  4 mg Oral Q8H PRN Ariel Elen Ansari MD        Or    ondansetron (ZOFRAN) injection 4 mg  4 mg IntraVENous Q6H PRN Ariel Elen Ansari MD   4 mg at 22 0355    bisacodyl (DULCOLAX) EC tablet 5 mg  5 mg Oral Daily PRN Ariel Elen Ansari MD        acetaminophen (TYLENOL) tablet 650 mg  650 mg Oral Q6H PRN Ariel Elen Ansari MD        Or    acetaminophen (TYLENOL) suppository 650 mg  650 mg Rectal Q6H PRN Ariel Constantine Stratton MD        0.9 % sodium chloride infusion   IntraVENous Continuous Ariel Constantine Stratton  mL/hr at 07/03/22 0755 NoRateChange at 07/03/22 0755    heparin (porcine) injection 5,000 Units  5,000 Units SubCUTAneous 3 times per day Anthony Lopez MD           Allergies: Allergies   Allergen Reactions    Flexeril [Cyclobenzaprine] Swelling    Imitrex [Sumatriptan] Other (See Comments)     Does something to her nerves.  Lyrica [Pregabalin] Other (See Comments)     Shuts her kidneys down       Problem List:    Patient Active Problem List   Diagnosis Code    Flank pain R10.9       Past Medical History:        Diagnosis Date    Arthritis     Chronic kidney disease     Diabetes mellitus ([address]     runs in family sugar pts bottoms out sometimes    Hypertension     Kidney stone        Past Surgical History:  History reviewed. No pertinent surgical history.     Social History:    Social History     Tobacco Use    Smoking status: Former Smoker    Smokeless tobacco: Never Used   Substance Use Topics    Alcohol use: Yes     Comment: 7 shot a month                                Counseling given: Not Answered      Vital Signs (Current):   Vitals:    07/02/22 1914 07/02/22 1915 07/03/22 0207 07/03/22 0301   BP: 136/88 (!) 132/102     Pulse: (!) 112 (!) 115  (!) 107   Resp: 22 20 16   Temp: 99.1 °F (37.3 °C) 99.2 °F (37.3 °C)  98.6 °F (37 °C)   TempSrc: Oral Oral Oral Oral   SpO2: 97% 98%     Weight: 170 lb (77.1 kg) 170 lb (77.1 kg)  161 lb 6 oz (73.2 kg)   Height: 5' 3\" (1.6 m) 5' 3\" (1.6 m)  5' 3\" (1.6 m)                                              BP Readings from Last 3 Encounters:   07/02/22 (!) 132/102   03/08/22 (!) 121/91   07/20/20 (!) 134/97       NPO Status:                                                                                 BMI:   Wt Readings from Last 3 Encounters:   07/03/22 161 lb 6 oz (73.2 kg)   03/08/22 162 lb (73.5 kg)   07/20/20 139 lb (63 kg)     Body mass index is 28.59 kg/m². CBC:   Lab Results   Component Value Date/Time    WBC 27.2 07/02/2022 07:51 PM    RBC 4.13 07/02/2022 07:51 PM    HGB 11.6 07/02/2022 07:51 PM    HCT 36.5 07/02/2022 07:51 PM    MCV 88.4 07/02/2022 07:51 PM    RDW 13.8 07/02/2022 07:51 PM     07/02/2022 07:51 PM       CMP:   Lab Results   Component Value Date/Time     07/02/2022 07:51 PM    K 3.3 07/02/2022 07:51 PM    CL 98 07/02/2022 07:51 PM    CO2 19 07/02/2022 07:51 PM    BUN 16 07/02/2022 07:51 PM    CREATININE 1.5 07/02/2022 07:51 PM    GFRAA 46 07/02/2022 07:51 PM    LABGLOM 38 07/02/2022 07:51 PM    GLUCOSE 130 07/02/2022 07:51 PM    PROT 6.9 07/02/2022 07:51 PM    CALCIUM 8.9 07/02/2022 07:51 PM    BILITOT 0.4 07/02/2022 07:51 PM    ALKPHOS 72 07/02/2022 07:51 PM    AST 20 07/02/2022 07:51 PM    ALT 13 07/02/2022 07:51 PM       POC Tests: No results for input(s): POCGLU, POCNA, POCK, POCCL, POCBUN, POCHEMO, POCHCT in the last 72 hours. Coags: No results found for: PROTIME, INR, APTT    HCG (If Applicable):   Lab Results   Component Value Date    PREGTESTUR NEGATIVE 07/02/2022        ABGs: No results found for: PHART, PO2ART, LCC1ZRW, BOQ6NWT, BEART, E3ZTKTXC     Type & Screen (If Applicable):  No results found for: LABABO, LABRH    Drug/Infectious Status (If Applicable):  Lab Results   Component Value Date/Time    HEPCAB REPEATEDLY REACTIVE 02/02/2021 01:42 PM       COVID-19 Screening (If Applicable):   Lab Results   Component Value Date/Time    COVID19 NOT DETECTED 03/08/2022 12:51 PM           Anesthesia Evaluation    Airway: Mallampati: II  TM distance: >3 FB   Neck ROM: full  Mouth opening: > = 3 FB   Dental:          Pulmonary:normal exam                               Cardiovascular:    (+) hypertension:,                   Neuro/Psych:               GI/Hepatic/Renal:             Endo/Other:                     Abdominal:             Vascular:           Other Findings: Multiple oral piercing removed          Anesthesia Plan      general     ASA 2       Induction: intravenous. MIPS: Postoperative opioids intended. Anesthetic plan and risks discussed with patient. Plan discussed with CRNA.     Attending anesthesiologist reviewed and agrees with Livan Thompson MD   7/3/2022

## 2022-07-03 NOTE — ED PROVIDER NOTES
CARE RECEIVED FROM: Dr. Quincy Chaparro  I reviewed the yo elements of the history, physical exam and initial treatment plan at the bedside. ANCILLARY DATA:  I reviewed the images. Radiologist interpretation:   CT ABDOMEN PELVIS WO CONTRAST Additional Contrast? None   Final Result   Acute left obstructive uropathy secondary to 3 distal left ureteral calculi   each measuring 5-6 mm.  1 calculus is perched at the left UVJ. The Loera catheter is malpositioned with the tip coiled in the vagina. Mild bibasilar airspace disease, likely atelectasis. Pneumonia not excluded. Findings were discussed with Jael Parikh at 10:49 pm on 7/2/2022. XR CHEST PORTABLE   Final Result   Minimal left basilar atelectasis. Otherwise, clear lungs.               Labs Reviewed   BASIC METABOLIC PANEL - Abnormal; Notable for the following components:       Result Value    Sodium 132 (*)     Potassium 3.2 (*)     Chloride 98 (*)     CO2 19 (*)     CREATININE 1.5 (*)     Glucose 143 (*)     GFR Non- 38 (*)     GFR  46 (*)     All other components within normal limits   CBC WITH AUTO DIFFERENTIAL - Abnormal; Notable for the following components:    WBC 27.2 (*)     RBC 4.13 (*)     Hemoglobin 11.6 (*)     Hematocrit 36.5 (*)     MCHC 31.8 (*)     MPV 12.1 (*)     Segs Relative 87.1 (*)     Lymphocytes % 3.2 (*)     Monocytes % 7.9 (*)     Immature Neutrophil % 1.1 (*)     All other components within normal limits   BASIC METABOLIC PANEL W/ REFLEX TO MG FOR LOW K - Abnormal; Notable for the following components:    Sodium 133 (*)     Potassium 3.3 (*)     Chloride 98 (*)     CO2 19 (*)     CREATININE 1.5 (*)     Glucose 130 (*)     GFR Non- 38 (*)     GFR  46 (*)     All other components within normal limits   BRAIN NATRIURETIC PEPTIDE - Abnormal; Notable for the following components:    Pro-.4 (*)     All other components within normal limits   MAGNESIUM - Abnormal; Notable for the following components:    Magnesium 1.7 (*)     All other components within normal limits   URINALYSIS WITH MICROSCOPIC   PREGNANCY, URINE   SPECIMEN REJECTION   HEPATIC FUNCTION PANEL   LIPASE   TROPONIN   HCG, QUANTITATIVE, PREGNANCY     MEDICAL DECISION MAKING / PLAN:  This is a 70-year-old female that presented the emergency room with complaints of some left-sided flank pain with nausea and dysuria. Has previous history of renal stones. Her CT imaging here demonstrated 3 separate distal ureteral stones at the left UVJ vision 5 to 6 mm. She had mild elevation of creatinine to 1.5 and her CBC demonstrates significant leukocytosis with white blood count 27.2. She was empirically started on Rocephin. Previous physician spoke with Dr. Daniela Rueda of urology who agreed with antibiotic therapy with plans to take patient for intervention in the morning. At time of signout the patient was awaiting hospitalization by hospitalist team.  A urine sample had not yet been collected prior to signout. Urinalysis here does not demonstrate evidence of hematuria and actually does not show signs of infection currently. Given her leukocytosis as well as tachycardia here I did add on lactic acid and blood cultures. Patient was discussed with Dr. Vladimir Paz of the hospitalist team who agreed to hospitalize the patient for further management. FINAL IMPRESSION:  1. Ureterolithiasis      ? Electronically signed by:  Rainer Trent DO, 7/3/2022        Raienr Trent DO  07/03/22 9905

## 2022-07-03 NOTE — CONSULTS
Department of Urology   Consult Note  [address] [place] [address] 1 2 3 4 5    Date: 7/3/2022   Patient: Armani Vasquez   : 1980   DOA: 2022   MRN: [id number]   ROOM#: [id number]     Reason for Consult:  Left ureteral stones  Requesting Practitioner:  [address] [place] [address] ED    CHIEF COMPLAINT:  Flank Pain (both right and left flank pain since yesterday. 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 diabetic female with significant past medical history of stones who presented with bilateral flank pain. H/o stones. [address] [place] [address] ED HPI 22: Reggie Ulrich is a 43 y.o. female that presents bilateral flank pain. Patient states she was born with defective kidneys and has had a left kidney surgery in . Also history of kidney stones. States that for the last day She is unable to urinate. Usually she can drink cranberry juice and it will reverse the symptoms but it did not happen this time. Reports dysuria he does urinate. When she changes positions it does not help with the pain. Describes the pain as squeezing that is unremitting. She reports nausea, vomiting and chills. \"    Past Medical History:        Diagnosis Date    Arthritis     Chronic kidney disease     Diabetes mellitus ([address]     runs in family sugar pts bottoms out sometimes    Hypertension     Kidney stone        Past Surgical History:    History reviewed. No pertinent surgical history.     Current Medications:   Current Facility-Administered Medications: cefTRIAXone (ROCEPHIN) 1000 mg IVPB in 50 mL D5W minibag, 1,000 mg, IntraVENous, Q24H  oxyCODONE-acetaminophen (PERCOCET) 5-325 MG per tablet 1 tablet, 1 tablet, Oral, Q8H PRN  sodium chloride flush 0.9 % injection 10 mL, 10 mL, IntraVENous, 2 times per day  sodium chloride flush 0.9 % injection 10 mL, 10 mL, IntraVENous, PRN  0.9 % sodium chloride infusion, , IntraVENous, PRN  enoxaparin (LOVENOX) injection 40 mg, 40 mg, SubCUTAneous, Daily  ondansetron (ZOFRAN-ODT) disintegrating tablet 4 mg, 4 mg, Oral, Q8H PRN **OR** ondansetron (ZOFRAN) injection 4 mg, 4 mg, IntraVENous, Q6H PRN  bisacodyl (DULCOLAX) EC tablet 5 mg, 5 mg, Oral, Daily PRN  acetaminophen (TYLENOL) tablet 650 mg, 650 mg, Oral, Q6H PRN **OR** acetaminophen (TYLENOL) suppository 650 mg, 650 mg, Rectal, Q6H PRN  0.9 % sodium chloride infusion, , IntraVENous, Continuous    Allergies:  Flexeril [cyclobenzaprine], Imitrex [sumatriptan], and Lyrica [pregabalin]    Social History:   TOBACCO:   reports that she has quit smoking. She has never used smokeless tobacco.  ETOH:   reports current alcohol use. DRUGS:   reports no history of drug use. Family History:       Problem Relation Age of Onset    Diabetes Mother     Diabetes Father     High Blood Pressure Father     Obesity Sister     High Blood Pressure Sister     No Known Problems Brother     No Known Problems Sister     High Blood Pressure Sister     No Known Problems Sister        REVIEW OF SYSTEMS:  See HPI    PHYSICAL EXAM:    VITALS:  BP (!) 132/102   Pulse (!) 107   Temp 98.6 °F (37 °C) (Oral)   Resp 16   Ht 5' 3\" (1.6 m)   Wt 161 lb 6 oz (73.2 kg)   LMP 2022   SpO2 98%   BMI 28.59 kg/m²   TEMPERATURE:  Current - Temp: 98.6 °F (37 °C);    Max - Temp  Av °F (37.2 °C)  Min: 98.6 °F (37 °C)  Max: 99.2 °F (37.3 °C)    24HR BLOOD PRESSURE RANGE:  Systolic (34KAP), MWU:595 , Min:132 , FEE:078   ; Diastolic (92RNS), CUK:71, Min:88, Max:102    CONSTITUTIONAL:  awake, alert, cooperative, no apparent distress, and appears stated age  EYES:  Lids and lashes normal, pupils equal  NECK:  supple, symmetrical, trachea midline and skin normal  BACK:  Symmetric, no curvature, spinous processes are non-tender on palpation, paraspinous muscles are non-tender on palpation, no costal vertebral tenderness  LUNGS:  No increased work of breathing  ABDOMEN:  No scars, normal bowel sounds, soft, non-distended, non-tender, no masses palpated, no hepatosplenomegally    Data:    WBC:    Lab Results   Component Value Date/Time    WBC 27.2 07/02/2022 07:51 PM     Hemoglobin/Hematocrit:    Lab Results   Component Value Date/Time    HGB 11.6 07/02/2022 07:51 PM    HCT 36.5 07/02/2022 07:51 PM     BMP:    Lab Results   Component Value Date/Time     07/02/2022 07:51 PM    K 3.3 07/02/2022 07:51 PM    CL 98 07/02/2022 07:51 PM    CO2 19 07/02/2022 07:51 PM    BUN 16 07/02/2022 07:51 PM    LABALBU 4.1 07/02/2022 07:51 PM    CREATININE 1.5 07/02/2022 07:51 PM    CALCIUM 8.9 07/02/2022 07:51 PM    GFRAA 46 07/02/2022 07:51 PM    LABGLOM 38 07/02/2022 07:51 PM     Results for Jatin Ford (MRN [id number]) as of 7/3/2022 07:34   Ref.  Range 7/2/2022 23:05   Color, UA Latest Ref Range: YELLOW  YELLOW   Clarity, UA Latest Ref Range: CLEAR  CLEAR   Bilirubin, Urine Latest Ref Range: NEGATIVE MG/DL NEGATIVE   Ketones, Urine Latest Ref Range: NEGATIVE MG/DL NEGATIVE   Specific Gravity, UA Latest Ref Range: 1.001 - 1.035  1.015   Blood, Urine Latest Ref Range: NEGATIVE  SMALL   Protein, UA Latest Ref Range: NEGATIVE MG/DL 30 MG/DL   Urobilinogen, Urine Latest Ref Range: 0.2 - 1.0 MG/DL 0.2   Leukocyte Esterase, Urine Latest Ref Range: NEGATIVE  NEGATIVE   Glucose, Urine Latest Ref Range: NEGATIVE MG/DL NEGATIVE   Nitrite Urine, Quantitative Latest Ref Range: NEGATIVE  NEGATIVE   pH, Urine Latest Ref Range: 5.0 - 8.0  6.0   Hyaline Casts, UA Latest Units: /LPF 2   Mucus, UA Latest Ref Range: NEGATIVE HPF RARE (A)   WBC, UA Latest Ref Range: 0 - 5 /HPF 3   RBC, UA Latest Ref Range: 0 - 6 /HPF 2   Squam Epithel, UA Latest Units: /HPF 2   Bacteria, UA Latest Ref Range: NEGATIVE /HPF RARE (A)   Trichomonas, UA Latest Ref Range: NONE SEEN /HPF NONE SEEN   Specific Gravity, Urine Latest Ref Range: 1.001 - 1.035  1.015   Pregnancy, Urine Latest Ref Range: NEGATIVE abnormality. Acute left obstructive uropathy secondary to 3 distal left ureteral calculi each measuring 5-6 mm.  1 calculus is perched at the left UVJ. The Loera catheter is malpositioned with the tip coiled in the vagina. Mild bibasilar airspace disease, likely atelectasis. Pneumonia not excluded. Findings were discussed with Sarah Elizabeth at 10:49 pm on 7/2/2022. Impression: 44 yo diabetic female with left renal colic 2nd left distal ureteral stones admitted to IM for further evaluation/management. On IV Rocephin, NPO. Recommendation: cystoscopy, left ureteroscopy/stone manipulation/stent insertion. All questions answered. Consent signed/witnessed, flank initialed. Will proceed.  Ed [phone]    Patient seen and examined, chart reviewed.      Juliane Stewart MD     Electronically signed at 7/3/2022

## 2022-07-03 NOTE — PROGRESS NOTES
2397: Arrived to PACU from OR. Monitors applied, alarms on. Report obtained from Ruthie Smart and [employer].  1005: KUB done. Ice chips given. Discomfort denied. 1022: Transported to room 1122.

## 2022-07-04 LAB
ANION GAP SERPL CALCULATED.3IONS-SCNC: 9 MMOL/L (ref 4–16)
BASOPHILS ABSOLUTE: 0 K/CU MM
BASOPHILS RELATIVE PERCENT: 0.1 % (ref 0–1)
BUN BLDV-MCNC: 18 MG/DL (ref 6–23)
CALCIUM SERPL-MCNC: 8.1 MG/DL (ref 8.3–10.6)
CHLORIDE BLD-SCNC: 105 MMOL/L (ref 99–110)
CO2: 22 MMOL/L (ref 21–32)
CREAT SERPL-MCNC: 1.2 MG/DL (ref 0.6–1.1)
DIFFERENTIAL TYPE: ABNORMAL
EOSINOPHILS ABSOLUTE: 0 K/CU MM
EOSINOPHILS RELATIVE PERCENT: 0.1 % (ref 0–3)
GFR AFRICAN AMERICAN: 60 ML/MIN/1.73M2
GFR NON-AFRICAN AMERICAN: 49 ML/MIN/1.73M2
GLUCOSE BLD-MCNC: 160 MG/DL (ref 70–99)
HCT VFR BLD CALC: 31.5 % (ref 37–47)
HEMOGLOBIN: 9.9 GM/DL (ref 12.5–16)
IMMATURE NEUTROPHIL %: 1.7 % (ref 0–0.43)
LYMPHOCYTES ABSOLUTE: 0.6 K/CU MM
LYMPHOCYTES RELATIVE PERCENT: 3.5 % (ref 24–44)
MCH RBC QN AUTO: 28 PG (ref 27–31)
MCHC RBC AUTO-ENTMCNC: 31.4 % (ref 32–36)
MCV RBC AUTO: 89 FL (ref 78–100)
MONOCYTES ABSOLUTE: 1.2 K/CU MM
MONOCYTES RELATIVE PERCENT: 6.6 % (ref 0–4)
NUCLEATED RBC %: 0 %
PDW BLD-RTO: 13.9 % (ref 11.7–14.9)
PLATELET # BLD: 143 K/CU MM (ref 140–440)
PMV BLD AUTO: 12.6 FL (ref 7.5–11.1)
POTASSIUM SERPL-SCNC: 3.6 MMOL/L (ref 3.5–5.1)
RBC # BLD: 3.54 M/CU MM (ref 4.2–5.4)
SEGMENTED NEUTROPHILS ABSOLUTE COUNT: 16.1 K/CU MM
SEGMENTED NEUTROPHILS RELATIVE PERCENT: 88 % (ref 36–66)
SODIUM BLD-SCNC: 136 MMOL/L (ref 135–145)
TOTAL IMMATURE NEUTOROPHIL: 0.32 K/CU MM
TOTAL NUCLEATED RBC: 0 K/CU MM
WBC # BLD: 18.3 K/CU MM (ref 4–10.5)

## 2022-07-04 PROCEDURE — 36415 COLL VENOUS BLD VENIPUNCTURE: CPT

## 2022-07-04 PROCEDURE — 6360000002 HC RX W HCPCS: Performed by: UROLOGY

## 2022-07-04 PROCEDURE — 1200000000 HC SEMI PRIVATE

## 2022-07-04 PROCEDURE — 6370000000 HC RX 637 (ALT 250 FOR IP): Performed by: UROLOGY

## 2022-07-04 PROCEDURE — 2580000003 HC RX 258: Performed by: UROLOGY

## 2022-07-04 PROCEDURE — 6370000000 HC RX 637 (ALT 250 FOR IP): Performed by: HOSPITALIST

## 2022-07-04 PROCEDURE — 85025 COMPLETE CBC W/AUTO DIFF WBC: CPT

## 2022-07-04 PROCEDURE — 6370000000 HC RX 637 (ALT 250 FOR IP): Performed by: NURSE PRACTITIONER

## 2022-07-04 PROCEDURE — 80048 BASIC METABOLIC PNL TOTAL CA: CPT

## 2022-07-04 PROCEDURE — 94761 N-INVAS EAR/PLS OXIMETRY MLT: CPT

## 2022-07-04 RX ORDER — BUTALBITAL, ACETAMINOPHEN AND CAFFEINE 50; 325; 40 MG/1; MG/1; MG/1
1 TABLET ORAL EVERY 6 HOURS PRN
Status: DISCONTINUED | OUTPATIENT
Start: 2022-07-04 | End: 2022-07-07 | Stop reason: HOSPADM

## 2022-07-04 RX ADMIN — OXYCODONE AND ACETAMINOPHEN 1 TABLET: 5; 325 TABLET ORAL at 05:46

## 2022-07-04 RX ADMIN — BUTALBITAL, ACETAMINOPHEN AND CAFFEINE 1 TABLET: 50; 325; 40 TABLET ORAL at 15:21

## 2022-07-04 RX ADMIN — BISACODYL 5 MG: 5 TABLET, COATED ORAL at 09:35

## 2022-07-04 RX ADMIN — SODIUM CHLORIDE: 9 INJECTION, SOLUTION INTRAVENOUS at 13:19

## 2022-07-04 RX ADMIN — OXYCODONE AND ACETAMINOPHEN 1 TABLET: 5; 325 TABLET ORAL at 13:41

## 2022-07-04 RX ADMIN — HEPARIN SODIUM 5000 UNITS: 5000 INJECTION INTRAVENOUS; SUBCUTANEOUS at 21:47

## 2022-07-04 RX ADMIN — BUTALBITAL, ACETAMINOPHEN AND CAFFEINE 1 TABLET: 50; 325; 40 TABLET ORAL at 05:44

## 2022-07-04 RX ADMIN — SODIUM CHLORIDE: 9 INJECTION, SOLUTION INTRAVENOUS at 02:19

## 2022-07-04 RX ADMIN — CEFTRIAXONE SODIUM 1000 MG: 1 INJECTION, POWDER, FOR SOLUTION INTRAMUSCULAR; INTRAVENOUS at 21:06

## 2022-07-04 RX ADMIN — SODIUM CHLORIDE: 9 INJECTION, SOLUTION INTRAVENOUS at 23:32

## 2022-07-04 RX ADMIN — ACETAMINOPHEN 650 MG: 325 TABLET ORAL at 18:36

## 2022-07-04 RX ADMIN — HEPARIN SODIUM 5000 UNITS: 5000 INJECTION INTRAVENOUS; SUBCUTANEOUS at 13:41

## 2022-07-04 RX ADMIN — OXYCODONE AND ACETAMINOPHEN 1 TABLET: 5; 325 TABLET ORAL at 21:48

## 2022-07-04 RX ADMIN — HEPARIN SODIUM 5000 UNITS: 5000 INJECTION INTRAVENOUS; SUBCUTANEOUS at 05:41

## 2022-07-04 RX ADMIN — ONDANSETRON 4 MG: 2 INJECTION INTRAMUSCULAR; INTRAVENOUS at 14:41

## 2022-07-04 ASSESSMENT — PAIN SCALES - GENERAL
PAINLEVEL_OUTOF10: 7
PAINLEVEL_OUTOF10: 5
PAINLEVEL_OUTOF10: 2
PAINLEVEL_OUTOF10: 7
PAINLEVEL_OUTOF10: 5
PAINLEVEL_OUTOF10: 7
PAINLEVEL_OUTOF10: 7
PAINLEVEL_OUTOF10: 10

## 2022-07-04 ASSESSMENT — PAIN DESCRIPTION - DESCRIPTORS
DESCRIPTORS: SPASM;THROBBING
DESCRIPTORS: OTHER (COMMENT)
DESCRIPTORS: CRAMPING;SHARP

## 2022-07-04 ASSESSMENT — PAIN DESCRIPTION - ORIENTATION
ORIENTATION: LOWER;LEFT
ORIENTATION: MID
ORIENTATION: LEFT;RIGHT;MID

## 2022-07-04 ASSESSMENT — PAIN DESCRIPTION - LOCATION
LOCATION: BACK;ABDOMEN
LOCATION: ABDOMEN
LOCATION: BACK;ABDOMEN
LOCATION: HEAD

## 2022-07-04 ASSESSMENT — PAIN DESCRIPTION - PAIN TYPE
TYPE: ACUTE PAIN
TYPE: ACUTE PAIN
TYPE: SURGICAL PAIN

## 2022-07-04 ASSESSMENT — PAIN DESCRIPTION - ONSET
ONSET: ON-GOING

## 2022-07-04 ASSESSMENT — PAIN DESCRIPTION - FREQUENCY
FREQUENCY: INTERMITTENT
FREQUENCY: CONTINUOUS
FREQUENCY: CONTINUOUS

## 2022-07-04 ASSESSMENT — PAIN - FUNCTIONAL ASSESSMENT: PAIN_FUNCTIONAL_ASSESSMENT: ACTIVITIES ARE NOT PREVENTED

## 2022-07-04 NOTE — PROGRESS NOTES
[facility] HOSPITALIST PROGRESS NOTE      PCP: Silvia Barrera MD    Date of Admission: 7/2/2022    Subjective:  Pain well controlled     Brief Hospital summary     The patient is a 55-year-old female with history of chronic hepatitis C who was admitted with left-sided flank pain. CT abdomen showed distal left ureteral calculi, urology consulted, IV fluids  Ceftriaxone started    Vitals signs:  Afebrile, heart rate 50s range, blood pressure 138/92, on room air    Medications: Ceftriaxone, heparin, normal saline 800 mm/h    Antibiotics: Ceftriaxone    Fluid status: 1675 cc    Labs: sodium 136, potassium 3.6, creatinine 1.2*  WBC 18.3, hemoglobin 9.9, platelets 496    Imaging:   CT renal stone protocol showed acute left obstructive uropathy secondary to 3 distal left ureteral calculi each measuring 5.6 mm, one calculus is post at left UV junction    Assessment/Plan:     Obstructive uropathy secondary to ureterolithiasis  Acute kidney injury:   Admitted with flank pain, leukocytosis, sinus tachycardia  CT renal showed obstructive uropathy  Flomax added, IV fluids  Creatinine down to 1.2 status post cystoscopy with stent placement  Continue ceftriaxone, awaiting cultures leukocytosis improving, WBC count 18.3  Check CBC in a.m., will await cultures      History of hepatitis C: Chronic stable    DVT prophlaxis:   Heparin      Physical Exam Performed:       BP (!) 138/92   Pulse 58   Temp 97.8 °F (36.6 °C) (Oral)   Resp 18   Ht 5' 3\" (1.6 m)   Wt 164 lb 12.8 oz (74.8 kg)   LMP 06/20/2022   SpO2 99%   BMI 29.19 kg/m²     Physical Exam  Constitutional:       General: She is not in acute distress. Appearance: Normal appearance. HENT:      Head: Normocephalic and atraumatic. Right Ear: External ear normal.      Left Ear: External ear normal.   Eyes:      Extraocular Movements: Extraocular movements intact. Pupils: Pupils are equal, round, and reactive to light.    Cardiovascular: to 3 distal left ureteral calculi   each measuring 5-6 mm.  1 calculus is perched at the left UVJ. The Loera catheter is malpositioned with the tip coiled in the vagina. Mild bibasilar airspace disease, likely atelectasis. Pneumonia not excluded. Findings were discussed with Yolie Cid at 10:49 pm on 7/2/2022. XR CHEST PORTABLE   Final Result   Minimal left basilar atelectasis. Otherwise, clear lungs.                     Albertina Acuña MD  7/4/2022 7:25 AM

## 2022-07-04 NOTE — PROGRESS NOTES
Pt up in room c/o svr pressure lower abd. Pt unable to sit or lie down. This RN assessed godoy- no kinks in tubing- no urine noted in tubing- but urine is in bag. This RN notified Dr. Adria Lee-  In room- instructed this RN to remove godoy. Godoy removed- immediately pt felt relief.

## 2022-07-05 LAB
ANION GAP SERPL CALCULATED.3IONS-SCNC: 12 MMOL/L (ref 4–16)
BASOPHILS ABSOLUTE: 0 K/CU MM
BASOPHILS RELATIVE PERCENT: 0.1 % (ref 0–1)
BUN BLDV-MCNC: 12 MG/DL (ref 6–23)
CALCIUM SERPL-MCNC: 7.8 MG/DL (ref 8.3–10.6)
CHLORIDE BLD-SCNC: 107 MMOL/L (ref 99–110)
CO2: 19 MMOL/L (ref 21–32)
CREAT SERPL-MCNC: 1 MG/DL (ref 0.6–1.1)
CULTURE: NORMAL
DIFFERENTIAL TYPE: ABNORMAL
EOSINOPHILS ABSOLUTE: 0.1 K/CU MM
EOSINOPHILS RELATIVE PERCENT: 0.8 % (ref 0–3)
GFR AFRICAN AMERICAN: >60 ML/MIN/1.73M2
GFR NON-AFRICAN AMERICAN: >60 ML/MIN/1.73M2
GLUCOSE BLD-MCNC: 111 MG/DL (ref 70–99)
HCT VFR BLD CALC: 28.9 % (ref 37–47)
HEMOGLOBIN: 9.1 GM/DL (ref 12.5–16)
IMMATURE NEUTROPHIL %: 0.6 % (ref 0–0.43)
LYMPHOCYTES ABSOLUTE: 1.2 K/CU MM
LYMPHOCYTES RELATIVE PERCENT: 10.2 % (ref 24–44)
Lab: NORMAL
MAGNESIUM: 1.6 MG/DL (ref 1.8–2.4)
MCH RBC QN AUTO: 27.9 PG (ref 27–31)
MCHC RBC AUTO-ENTMCNC: 31.5 % (ref 32–36)
MCV RBC AUTO: 88.7 FL (ref 78–100)
MONOCYTES ABSOLUTE: 1 K/CU MM
MONOCYTES RELATIVE PERCENT: 8.3 % (ref 0–4)
NUCLEATED RBC %: 0 %
PDW BLD-RTO: 14.1 % (ref 11.7–14.9)
PLATELET # BLD: 120 K/CU MM (ref 140–440)
PMV BLD AUTO: 12.9 FL (ref 7.5–11.1)
POTASSIUM SERPL-SCNC: 3.4 MMOL/L (ref 3.5–5.1)
RBC # BLD: 3.26 M/CU MM (ref 4.2–5.4)
SEGMENTED NEUTROPHILS ABSOLUTE COUNT: 9.5 K/CU MM
SEGMENTED NEUTROPHILS RELATIVE PERCENT: 80 % (ref 36–66)
SODIUM BLD-SCNC: 138 MMOL/L (ref 135–145)
SPECIMEN: NORMAL
TOTAL IMMATURE NEUTOROPHIL: 0.07 K/CU MM
TOTAL NUCLEATED RBC: 0 K/CU MM
WBC # BLD: 11.9 K/CU MM (ref 4–10.5)

## 2022-07-05 PROCEDURE — 1200000000 HC SEMI PRIVATE

## 2022-07-05 PROCEDURE — 6360000002 HC RX W HCPCS: Performed by: UROLOGY

## 2022-07-05 PROCEDURE — 6360000002 HC RX W HCPCS: Performed by: HOSPITALIST

## 2022-07-05 PROCEDURE — 6370000000 HC RX 637 (ALT 250 FOR IP): Performed by: UROLOGY

## 2022-07-05 PROCEDURE — 94761 N-INVAS EAR/PLS OXIMETRY MLT: CPT

## 2022-07-05 PROCEDURE — 83735 ASSAY OF MAGNESIUM: CPT

## 2022-07-05 PROCEDURE — 2580000003 HC RX 258: Performed by: UROLOGY

## 2022-07-05 PROCEDURE — 85025 COMPLETE CBC W/AUTO DIFF WBC: CPT

## 2022-07-05 PROCEDURE — 80048 BASIC METABOLIC PNL TOTAL CA: CPT

## 2022-07-05 PROCEDURE — 6370000000 HC RX 637 (ALT 250 FOR IP): Performed by: HOSPITALIST

## 2022-07-05 PROCEDURE — 36415 COLL VENOUS BLD VENIPUNCTURE: CPT

## 2022-07-05 RX ORDER — POTASSIUM CHLORIDE 20 MEQ/1
40 TABLET, EXTENDED RELEASE ORAL ONCE
Status: COMPLETED | OUTPATIENT
Start: 2022-07-05 | End: 2022-07-05

## 2022-07-05 RX ORDER — CEFDINIR 300 MG/1
300 CAPSULE ORAL 2 TIMES DAILY
Qty: 14 CAPSULE | Refills: 0 | Status: SHIPPED | OUTPATIENT
Start: 2022-07-05 | End: 2022-07-12

## 2022-07-05 RX ORDER — MAGNESIUM SULFATE IN WATER 40 MG/ML
2000 INJECTION, SOLUTION INTRAVENOUS ONCE
Status: COMPLETED | OUTPATIENT
Start: 2022-07-05 | End: 2022-07-05

## 2022-07-05 RX ORDER — OXYCODONE HYDROCHLORIDE AND ACETAMINOPHEN 5; 325 MG/1; MG/1
1 TABLET ORAL EVERY 8 HOURS PRN
Qty: 9 TABLET | Refills: 0 | Status: SHIPPED | OUTPATIENT
Start: 2022-07-05 | End: 2022-07-08

## 2022-07-05 RX ADMIN — POTASSIUM CHLORIDE 40 MEQ: 20 TABLET, EXTENDED RELEASE ORAL at 09:30

## 2022-07-05 RX ADMIN — SODIUM CHLORIDE: 9 INJECTION, SOLUTION INTRAVENOUS at 10:01

## 2022-07-05 RX ADMIN — ACETAMINOPHEN 650 MG: 325 TABLET ORAL at 21:31

## 2022-07-05 RX ADMIN — HEPARIN SODIUM 5000 UNITS: 5000 INJECTION INTRAVENOUS; SUBCUTANEOUS at 15:09

## 2022-07-05 RX ADMIN — HEPARIN SODIUM 5000 UNITS: 5000 INJECTION INTRAVENOUS; SUBCUTANEOUS at 05:52

## 2022-07-05 RX ADMIN — HYDROMORPHONE HYDROCHLORIDE 1 MG: 1 INJECTION, SOLUTION INTRAMUSCULAR; INTRAVENOUS; SUBCUTANEOUS at 18:28

## 2022-07-05 RX ADMIN — HEPARIN SODIUM 5000 UNITS: 5000 INJECTION INTRAVENOUS; SUBCUTANEOUS at 21:29

## 2022-07-05 RX ADMIN — BISACODYL 5 MG: 5 TABLET, COATED ORAL at 21:39

## 2022-07-05 RX ADMIN — OXYCODONE AND ACETAMINOPHEN 1 TABLET: 5; 325 TABLET ORAL at 05:54

## 2022-07-05 RX ADMIN — MAGNESIUM SULFATE HEPTAHYDRATE 2000 MG: 2 INJECTION, SOLUTION INTRAVENOUS at 11:15

## 2022-07-05 RX ADMIN — OXYCODONE AND ACETAMINOPHEN 1 TABLET: 5; 325 TABLET ORAL at 23:24

## 2022-07-05 RX ADMIN — HYDROMORPHONE HYDROCHLORIDE 1 MG: 1 INJECTION, SOLUTION INTRAMUSCULAR; INTRAVENOUS; SUBCUTANEOUS at 12:02

## 2022-07-05 RX ADMIN — OXYCODONE AND ACETAMINOPHEN 1 TABLET: 5; 325 TABLET ORAL at 15:09

## 2022-07-05 RX ADMIN — ACETAMINOPHEN 650 MG: 325 TABLET ORAL at 04:03

## 2022-07-05 RX ADMIN — CEFTRIAXONE SODIUM 1000 MG: 1 INJECTION, POWDER, FOR SOLUTION INTRAMUSCULAR; INTRAVENOUS at 21:34

## 2022-07-05 ASSESSMENT — PAIN DESCRIPTION - LOCATION
LOCATION: ABDOMEN;PELVIS
LOCATION: BACK
LOCATION: ABDOMEN;BACK
LOCATION: ABDOMEN
LOCATION: ABDOMEN;BACK
LOCATION: ABDOMEN

## 2022-07-05 ASSESSMENT — PAIN DESCRIPTION - DESCRIPTORS
DESCRIPTORS: SHOOTING
DESCRIPTORS: SPASM;THROBBING
DESCRIPTORS: SHARP
DESCRIPTORS: SHARP;CRAMPING
DESCRIPTORS: SHARP;SQUEEZING
DESCRIPTORS: SHOOTING;THROBBING

## 2022-07-05 ASSESSMENT — PAIN SCALES - GENERAL
PAINLEVEL_OUTOF10: 8
PAINLEVEL_OUTOF10: 0
PAINLEVEL_OUTOF10: 7
PAINLEVEL_OUTOF10: 9
PAINLEVEL_OUTOF10: 6
PAINLEVEL_OUTOF10: 8
PAINLEVEL_OUTOF10: 9
PAINLEVEL_OUTOF10: 10

## 2022-07-05 ASSESSMENT — PAIN DESCRIPTION - PAIN TYPE
TYPE: SURGICAL PAIN
TYPE: SURGICAL PAIN

## 2022-07-05 ASSESSMENT — PAIN - FUNCTIONAL ASSESSMENT
PAIN_FUNCTIONAL_ASSESSMENT: ACTIVITIES ARE NOT PREVENTED
PAIN_FUNCTIONAL_ASSESSMENT: PREVENTS OR INTERFERES SOME ACTIVE ACTIVITIES AND ADLS

## 2022-07-05 ASSESSMENT — PAIN DESCRIPTION - FREQUENCY
FREQUENCY: CONTINUOUS
FREQUENCY: CONTINUOUS

## 2022-07-05 ASSESSMENT — PAIN DESCRIPTION - ORIENTATION
ORIENTATION: MID
ORIENTATION: MID
ORIENTATION: LEFT;LOWER
ORIENTATION: LEFT;LOWER

## 2022-07-05 ASSESSMENT — PAIN DESCRIPTION - ONSET
ONSET: ON-GOING
ONSET: ON-GOING

## 2022-07-05 ASSESSMENT — PAIN SCALES - WONG BAKER: WONGBAKER_NUMERICALRESPONSE: 0

## 2022-07-05 NOTE — PROGRESS NOTES
[facility] [address]   Progress Note  [facility] 0 1 2      Date: 2022   Patient: Srikanth Chun   : 1980   DOA: 2022   MRN: [id number]   ROOM#: [id number]     Admit Date: 2022     Collaborating Urologist on Call at time of admission: Dr. Derik Hennessy  CC: lank pain since yesterday. 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     Objective:    Vitals:    BP (!) 143/92   Pulse 63   Temp 98.7 °F (37.1 °C) (Oral)   Resp 16   Ht 5' 3\" (1.6 m)   Wt 170 lb 4.8 oz (77.2 kg)   LMP 2022   SpO2 99%   BMI 30.17 kg/m²    Temp  Av.8 °F (37.7 °C)  Min: 98.7 °F (37.1 °C)  Max: 100.8 °F (38.2 °C)     Intake/Output Summary (Last 24 hours) at 2022 1006  Last data filed at 2022 0557  Gross per 24 hour   Intake --   Output 1375 ml   Net -1375 ml       Physical Exam:   General appearance: alert, appears stated age, cooperative, no distress and mildly obese  Head: Normocephalic, without obvious abnormality, atraumatic  Back: Left CVA tenderness  Abdomen: Soft, non-distended, TTP in LLQ    Labs:   WBC:    Lab Results   Component Value Date/Time    WBC 11.9 2022 04:34 AM      Hemoglobin/Hematocrit:    Lab Results   Component Value Date/Time    HGB 9.1 2022 04:34 AM    HCT 28.9 2022 04:34 AM      BMP:   Lab Results   Component Value Date/Time     2022 04:34 AM    K 3.4 2022 04:34 AM     2022 04:34 AM    CO2 19 2022 04:34 AM    BUN 12 2022 04:34 AM    LABALBU 4.1 2022 07:51 PM    CREATININE 1.0 2022 04:34 AM    CALCIUM 7.8 2022 04:34 AM    GFRAA >60 2022 04:34 AM    LABGLOM >60 2022 04:34 AM     Blood Culture: NGTD  Urine Culture: pending    Imaging:  CT ABDOMEN PELVIS WO CONTRAST Additional Contrast? None    Result Date: 7/2/2022  EXAMINATION: STONE PROTOCOL CT OF THE ABDOMEN AND PELVIS 7/2/2022 9:39 pm TECHNIQUE: CT of the abdomen and pelvis was performed without the administration of intravenous contrast. Multiplanar reformatted images are provided for review. Automated exposure control, iterative reconstruction, and/or weight based adjustment of the mA/kV was utilized to reduce the radiation dose to as low as reasonably achievable. COMPARISON: None. HISTORY: ORDERING SYSTEM PROVIDED HISTORY: abdominal pain, urinary retention TECHNOLOGIST PROVIDED HISTORY: Reason for exam:->abdominal pain, urinary retention Additional Contrast?->None Is the patient pregnant?->No Reason for Exam: abdominal pain, urinary retention FINDINGS: LOWER CHEST: There is mild bibasilar airspace disease. KIDNEYS AND URINARY TRACT: There is moderate left hydronephrosis and hydroureter secondary to 3 obstructing calculi in the distal left ureter each measuring approximately 5-6 mm.  1 calculus is perched at the left UVJ (axial image [id number]). The left kidney is enlarged and there is prominent left perinephric and periureteral fat stranding. No other renal calculi. ORGANS: Visualized portions of the unenhanced liver, spleen, pancreas, and adrenal glands demonstrate no acute abnormality. There are no calcified gallstones. No inflammatory changes in the visualized portion of the gallbladder fossa. GI/BOWEL: The appendix is normal.  Unopacified bowel loops are unremarkable. No bowel obstruction. PELVIS: There is a Loera catheter coiled in the vagina including the inflated balloon tip. The uterus and ovaries are unremarkable. PERITONEUM/RETROPERITONEUM: There is left retroperitoneal fat stranding. There is a small volume of free fluid in the cul-de-sac. There is no free air or adenopathy.  BONES/SOFT TISSUES: The osseous structures demonstrate no acute abnormality. Acute left obstructive uropathy secondary to 3 distal left ureteral calculi each measuring 5-6 mm.  1 calculus is perched at the left UVJ. The Loera catheter is malpositioned with the tip coiled in the vagina. Mild bibasilar airspace disease, likely atelectasis. Pneumonia not excluded. Findings were discussed with Ashlee Irving at 10:49 pm on 7/2/2022. XR ABDOMEN (KUB) (SINGLE AP VIEW)    Result Date: 7/3/2022  EXAMINATION: ONE SUPINE XRAY VIEW(S) OF THE ABDOMEN 7/3/2022 10:29 am COMPARISON: CT abdomen and pelvis 07/02/2022 HISTORY: ORDERING SYSTEM PROVIDED HISTORY: left distal ureteral stones visible? TECHNOLOGIST PROVIDED HISTORY: Reason for exam:->left distal ureteral stones visible? Reason for Exam: left distal ureteral stones visible? FINDINGS: There has been interval placement of a left ureteral stent. The proximal portion of the stent appears looped within the proximal ureter or dilated left renal pelvis. The distal portion of the stent is normal in configuration. No abnormally dilated loops of bowel are identified. Abnormal appearance of the proximal portion of a left ureteral stent which appears looped within the proximal left ureter or dilated left renal pelvis. FL LESS THAN 1 HOUR    Result Date: 7/3/2022  Radiology exam is complete. No Radiologist dictation. Please follow up with ordering provider. XR CHEST PORTABLE    Result Date: 7/2/2022  EXAMINATION: ONE XRAY VIEW OF THE CHEST 7/2/2022 8:03 pm COMPARISON: March 8, 2022. HISTORY: ORDERING SYSTEM PROVIDED HISTORY: elevated BNP TECHNOLOGIST PROVIDED HISTORY: Reason for exam:->elevated BNP Reason for Exam: elevated BNP Additional signs and symptoms: bilateral flank pain FINDINGS: A portable upright frontal view chest radiograph was obtained. The heart size, mediastinal contour and pleural spaces are within normal limits. Minimal atelectasis is present at the left lung base.   The lungs are otherwise clear. There is no focal consolidation or pneumothorax. The pulmonary vascular pattern is within normal limits. No significant thoracic osseous abnormality. Minimal left basilar atelectasis. Otherwise, clear lungs. Assessment & Plan:      Evans Bloch is a 43 y.o. female admitted 7/2/2022 for left ureteral calculi, UTI. 1) Left Ureteral Calculi: POD #2 cystoscopy, left retrograde pyelogram/stent insertion   CT a/p 7/2/22: Acute left obstructive uropathy secondary to 3 distal left ureteral calculi each measuring 5-6 mm.  1 calculus is perched at the left UVJ. Cr 1.0   Pt will need to have intervention of the stones as an outpatient once the urine is deemed to be sterile. This may be via ureteroscopy/laser stone manipulation or ESWL. 2) UTI: Urine cx pending   WBC 11.9, afebrile; T-max 100.8 overnight   On IV Rocephin    Will follow. Patient seen and examined, chart reviewed.      Electronically signed by Dustin Glynn PA-C on 7/5/2022 at 10:06 AM

## 2022-07-05 NOTE — DISCHARGE SUMMARY
[facility]    Patient Demographics    Patient. Breanna Austin  Date of Birth. 1980  MRN. [id number]     Primary care provider. Lolly Leos MD  (Tel: [phone])    Admit date: 7/2/2022    Discharge date (blank if same as Note Date): Note Date: 7/5/2022     Reason for Hospitalization. Chief Complaint   Patient presents with    Flank Pain     both right and left flank pain since yesterday. 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 Principal Problem:    Flank pain  Resolved Problems:    * No resolved hospital problems. *         Hospital Course:     The patient is a 49-year-old female with history of chronic hepatitis C who was admitted with left-sided flank pain. Obstructive uropathy secondary to ureterolithiasis  Acute kidney injury:     Admitted with flank pain, leukocytosis, sinus tachycardia  CT renal showed obstructive uropathy  Flomax added, IV fluids  Creatinine down to 1.2 status post cystoscopy with stent placement  Continue ceftriaxone, awaiting cultures leukocytosis improving, WBC count 11.9  Creatinine down to 1  Cultures remain negative, will switch to cefdinir for 7 more days  Discharged home, stable at time of discharge       Invasive procedures and treatments. 1. None     Consults. IP CONSULT TO UROLOGY  IP CONSULT TO HOSPITALIST    Physical examination on discharge day. BP (!) 144/95   Pulse 72   Temp 99.5 °F (37.5 °C) (Oral)   Resp 18   Ht 5' 3\" (1.6 m)   Wt 170 lb 4.8 oz (77.2 kg)   LMP 06/20/2022   SpO2 97%   BMI 30.17 kg/m²   General appearance. Alert. Looks comfortable. HEENT. Sclera clear. Moist mucus membranes. Cardiovascular. Regular rate and rhythm, normal S1, S2. No murmur. Respiratory. Not using accessory muscles. Clear to auscultation bilaterally, no wheeze.   Gastrointestinal. Abdomen soft, non-tender, not distended, normal bowel sounds  Neurology. Facial symmetry. No speech deficits. Moving all extremities equally. Extremities. No edema in lower extremities. Skin. Warm, dry, normal turgor    Condition at time of discharge stable    Medication instructions provided to patient at discharge. Medication List      ASK your doctor about these medications    butalbital-APAP-caffeine -40 MG Caps per capsule  Commonly known as: Fioricet  Take 1 capsule by mouth every 4 hours as needed for Headaches     naproxen 500 MG tablet  Commonly known as: Naprosyn  Take 1 tablet by mouth 2 times daily     ondansetron 4 MG disintegrating tablet  Commonly known as: Zofran ODT  Take 1 tablet by mouth every 8 hours as needed for Nausea or Vomiting            Discharge recommendations given to patient. Follow Up. PCP in 1 week   Disposition. home  Activity. As tolerated  Diet: ADULT DIET; Regular      Spent 23 minutes in discharge process.     Signed:  Babs Dave MD     7/5/2022 7:15 AM

## 2022-07-05 NOTE — CARE COORDINATION
Chart reviewed and pt discussed in IDR. Pt is from home, independent prior to admission. Pt does not use any DME or have any [place] [id number] services. Pt has PCP but does not have any insurance listed. Pt has met with [facility] and filled out financial assistance application. No other DC needs identified at this time. Pt may need med assist at DC depending on what medications are prescribed at DC. CM available.

## 2022-07-06 ENCOUNTER — APPOINTMENT (OUTPATIENT)
Dept: CT IMAGING | Age: 42
DRG: 659 | End: 2022-07-06

## 2022-07-06 LAB
ANION GAP SERPL CALCULATED.3IONS-SCNC: 9 MMOL/L (ref 4–16)
BASOPHILS ABSOLUTE: 0 K/CU MM
BASOPHILS RELATIVE PERCENT: 0.3 % (ref 0–1)
BUN BLDV-MCNC: 7 MG/DL (ref 6–23)
CALCIUM SERPL-MCNC: 8.9 MG/DL (ref 8.3–10.6)
CHLORIDE BLD-SCNC: 104 MMOL/L (ref 99–110)
CO2: 25 MMOL/L (ref 21–32)
CREAT SERPL-MCNC: 0.9 MG/DL (ref 0.6–1.1)
DIFFERENTIAL TYPE: ABNORMAL
EOSINOPHILS ABSOLUTE: 0.2 K/CU MM
EOSINOPHILS RELATIVE PERCENT: 2.2 % (ref 0–3)
GFR AFRICAN AMERICAN: >60 ML/MIN/1.73M2
GFR NON-AFRICAN AMERICAN: >60 ML/MIN/1.73M2
GLUCOSE BLD-MCNC: 92 MG/DL (ref 70–99)
HCT VFR BLD CALC: 28.5 % (ref 37–47)
HEMOGLOBIN: 8.9 GM/DL (ref 12.5–16)
IMMATURE NEUTROPHIL %: 0.6 % (ref 0–0.43)
LYMPHOCYTES ABSOLUTE: 1.7 K/CU MM
LYMPHOCYTES RELATIVE PERCENT: 24.9 % (ref 24–44)
MCH RBC QN AUTO: 27.9 PG (ref 27–31)
MCHC RBC AUTO-ENTMCNC: 31.2 % (ref 32–36)
MCV RBC AUTO: 89.3 FL (ref 78–100)
MONOCYTES ABSOLUTE: 1.4 K/CU MM
MONOCYTES RELATIVE PERCENT: 19.8 % (ref 0–4)
NUCLEATED RBC %: 0 %
PDW BLD-RTO: 14.4 % (ref 11.7–14.9)
PLATELET # BLD: 171 K/CU MM (ref 140–440)
PMV BLD AUTO: 12.5 FL (ref 7.5–11.1)
POTASSIUM SERPL-SCNC: 3.7 MMOL/L (ref 3.5–5.1)
RBC # BLD: 3.19 M/CU MM (ref 4.2–5.4)
SEGMENTED NEUTROPHILS ABSOLUTE COUNT: 3.6 K/CU MM
SEGMENTED NEUTROPHILS RELATIVE PERCENT: 52.2 % (ref 36–66)
SODIUM BLD-SCNC: 138 MMOL/L (ref 135–145)
TOTAL IMMATURE NEUTOROPHIL: 0.04 K/CU MM
TOTAL NUCLEATED RBC: 0 K/CU MM
WBC # BLD: 6.9 K/CU MM (ref 4–10.5)

## 2022-07-06 PROCEDURE — 2580000003 HC RX 258: Performed by: UROLOGY

## 2022-07-06 PROCEDURE — 87040 BLOOD CULTURE FOR BACTERIA: CPT

## 2022-07-06 PROCEDURE — 80048 BASIC METABOLIC PNL TOTAL CA: CPT

## 2022-07-06 PROCEDURE — 6370000000 HC RX 637 (ALT 250 FOR IP): Performed by: UROLOGY

## 2022-07-06 PROCEDURE — 6360000002 HC RX W HCPCS: Performed by: UROLOGY

## 2022-07-06 PROCEDURE — 6360000002 HC RX W HCPCS: Performed by: HOSPITALIST

## 2022-07-06 PROCEDURE — 36415 COLL VENOUS BLD VENIPUNCTURE: CPT

## 2022-07-06 PROCEDURE — 85025 COMPLETE CBC W/AUTO DIFF WBC: CPT

## 2022-07-06 PROCEDURE — 74176 CT ABD & PELVIS W/O CONTRAST: CPT

## 2022-07-06 PROCEDURE — 94761 N-INVAS EAR/PLS OXIMETRY MLT: CPT

## 2022-07-06 PROCEDURE — 2580000003 HC RX 258: Performed by: HOSPITALIST

## 2022-07-06 PROCEDURE — 1200000000 HC SEMI PRIVATE

## 2022-07-06 RX ADMIN — HEPARIN SODIUM 5000 UNITS: 5000 INJECTION INTRAVENOUS; SUBCUTANEOUS at 22:38

## 2022-07-06 RX ADMIN — CEFEPIME 2000 MG: 2 INJECTION, POWDER, FOR SOLUTION INTRAVENOUS at 22:39

## 2022-07-06 RX ADMIN — HYDROMORPHONE HYDROCHLORIDE 1 MG: 1 INJECTION, SOLUTION INTRAMUSCULAR; INTRAVENOUS; SUBCUTANEOUS at 18:30

## 2022-07-06 RX ADMIN — HYDROMORPHONE HYDROCHLORIDE 1 MG: 1 INJECTION, SOLUTION INTRAMUSCULAR; INTRAVENOUS; SUBCUTANEOUS at 11:45

## 2022-07-06 RX ADMIN — HYDROMORPHONE HYDROCHLORIDE 1 MG: 1 INJECTION, SOLUTION INTRAMUSCULAR; INTRAVENOUS; SUBCUTANEOUS at 03:30

## 2022-07-06 RX ADMIN — HEPARIN SODIUM 5000 UNITS: 5000 INJECTION INTRAVENOUS; SUBCUTANEOUS at 06:15

## 2022-07-06 RX ADMIN — OXYCODONE AND ACETAMINOPHEN 1 TABLET: 5; 325 TABLET ORAL at 08:02

## 2022-07-06 RX ADMIN — CEFEPIME 2000 MG: 2 INJECTION, POWDER, FOR SOLUTION INTRAVENOUS at 11:49

## 2022-07-06 RX ADMIN — OXYCODONE AND ACETAMINOPHEN 1 TABLET: 5; 325 TABLET ORAL at 16:31

## 2022-07-06 RX ADMIN — HEPARIN SODIUM 5000 UNITS: 5000 INJECTION INTRAVENOUS; SUBCUTANEOUS at 15:05

## 2022-07-06 RX ADMIN — SODIUM CHLORIDE, PRESERVATIVE FREE 10 ML: 5 INJECTION INTRAVENOUS at 08:04

## 2022-07-06 ASSESSMENT — PAIN DESCRIPTION - LOCATION
LOCATION: ABDOMEN;BACK
LOCATION: BACK;FLANK
LOCATION: BACK
LOCATION: ABDOMEN;BACK
LOCATION: BACK;FLANK

## 2022-07-06 ASSESSMENT — PAIN SCALES - GENERAL
PAINLEVEL_OUTOF10: 8
PAINLEVEL_OUTOF10: 0
PAINLEVEL_OUTOF10: 8
PAINLEVEL_OUTOF10: 9
PAINLEVEL_OUTOF10: 7
PAINLEVEL_OUTOF10: 9
PAINLEVEL_OUTOF10: 9

## 2022-07-06 ASSESSMENT — PAIN SCALES - WONG BAKER
WONGBAKER_NUMERICALRESPONSE: 0

## 2022-07-06 ASSESSMENT — PAIN DESCRIPTION - DESCRIPTORS
DESCRIPTORS: ACHING
DESCRIPTORS: CRAMPING;NAGGING
DESCRIPTORS: THROBBING;TIGHTNESS

## 2022-07-06 ASSESSMENT — PAIN DESCRIPTION - ORIENTATION
ORIENTATION: LEFT
ORIENTATION: MID;LEFT

## 2022-07-06 NOTE — PROGRESS NOTES
[facility] [address]   Progress Note  [facility] 0 1 2      Date: 2022   Patient: Ronnie Moise   : 1980   DOA: 2022   MRN: [id number]   ROOM#: [id number]     Admit Date: 2022     Collaborating Urologist on Call at time of admission: Dr. Lorie Noe  CC: lank pain since yesterday. states she has defective kidney's and had surgery in   Reason for Consult:  Left ureteral stones  POD #3: Cystoscopy, left retrograde pyelogram/stent insertion    Subjective:     Pain: Moderate, no nausea and no vomiting,   Bowel Movement/Flatus:   Yes  Voiding:  easily,     Pt resting in bed, endorses pain in her left flank. Continues to have fevers overnight.     Objective:    Vitals:    BP (!) 147/92   Pulse 70   Temp 100.2 °F (37.9 °C) (Oral)   Resp 18   Ht 5' 3\" (1.6 m)   Wt 170 lb 4.8 oz (77.2 kg)   LMP 2022   SpO2 99%   BMI 30.17 kg/m²    Temp  Av.3 °F (37.4 °C)  Min: 98.7 °F (37.1 °C)  Max: 100.2 °F (37.9 °C)   No intake or output data in the 24 hours ending 22 9106    Physical Exam:   General appearance: alert, appears stated age, cooperative, no distress and mildly obese  Head: Normocephalic, without obvious abnormality, atraumatic  Back: Left CVA tenderness  Abdomen: Soft, non-distended, TTP in LLQ    Labs:   WBC:    Lab Results   Component Value Date/Time    WBC 6.9 2022 04:22 AM      Hemoglobin/Hematocrit:    Lab Results   Component Value Date/Time    HGB 8.9 2022 04:22 AM    HCT 28.5 2022 04:22 AM      BMP:   Lab Results   Component Value Date/Time     2022 04:22 AM    K 3.7 2022 04:22 AM     2022 04:22 AM    CO2 25 2022 04:22 AM    BUN 7 2022 04:22 AM    LABALBU 4.1 2022 07:51 PM    CREATININE 0.9 2022 04:22 AM    CALCIUM 8.9 2022 04:22 AM    GFRAA >60 2022 04:22 AM    LABGLOM >60 2022 04:22 AM     Blood Culture: NGTD  Urine Culture: No growth at 18 to 36 hours Imaging:  CT ABDOMEN PELVIS WO CONTRAST Additional Contrast? None    Result Date: 7/2/2022  EXAMINATION: STONE PROTOCOL CT OF THE ABDOMEN AND PELVIS 7/2/2022 9:39 pm TECHNIQUE: CT of the abdomen and pelvis was performed without the administration of intravenous contrast. Multiplanar reformatted images are provided for review. Automated exposure control, iterative reconstruction, and/or weight based adjustment of the mA/kV was utilized to reduce the radiation dose to as low as reasonably achievable. COMPARISON: None. HISTORY: ORDERING SYSTEM PROVIDED HISTORY: abdominal pain, urinary retention TECHNOLOGIST PROVIDED HISTORY: Reason for exam:->abdominal pain, urinary retention Additional Contrast?->None Is the patient pregnant?->No Reason for Exam: abdominal pain, urinary retention FINDINGS: LOWER CHEST: There is mild bibasilar airspace disease. KIDNEYS AND URINARY TRACT: There is moderate left hydronephrosis and hydroureter secondary to 3 obstructing calculi in the distal left ureter each measuring approximately 5-6 mm.  1 calculus is perched at the left UVJ (axial image [id number]). The left kidney is enlarged and there is prominent left perinephric and periureteral fat stranding. No other renal calculi. ORGANS: Visualized portions of the unenhanced liver, spleen, pancreas, and adrenal glands demonstrate no acute abnormality. There are no calcified gallstones. No inflammatory changes in the visualized portion of the gallbladder fossa. GI/BOWEL: The appendix is normal.  Unopacified bowel loops are unremarkable. No bowel obstruction. PELVIS: There is a Loera catheter coiled in the vagina including the inflated balloon tip. The uterus and ovaries are unremarkable. PERITONEUM/RETROPERITONEUM: There is left retroperitoneal fat stranding. There is a small volume of free fluid in the cul-de-sac. There is no free air or adenopathy. BONES/SOFT TISSUES: The osseous structures demonstrate no acute abnormality.      Acute left obstructive uropathy secondary to 3 distal left ureteral calculi each measuring 5-6 mm.  1 calculus is perched at the left UVJ. The Loera catheter is malpositioned with the tip coiled in the vagina. Mild bibasilar airspace disease, likely atelectasis. Pneumonia not excluded. Findings were discussed with Jenna  at 10:49 pm on 7/2/2022. XR ABDOMEN (KUB) (SINGLE AP VIEW)    Result Date: 7/3/2022  EXAMINATION: ONE SUPINE XRAY VIEW(S) OF THE ABDOMEN 7/3/2022 10:29 am COMPARISON: CT abdomen and pelvis 07/02/2022 HISTORY: ORDERING SYSTEM PROVIDED HISTORY: left distal ureteral stones visible? TECHNOLOGIST PROVIDED HISTORY: Reason for exam:->left distal ureteral stones visible? Reason for Exam: left distal ureteral stones visible? FINDINGS: There has been interval placement of a left ureteral stent. The proximal portion of the stent appears looped within the proximal ureter or dilated left renal pelvis. The distal portion of the stent is normal in configuration. No abnormally dilated loops of bowel are identified. Abnormal appearance of the proximal portion of a left ureteral stent which appears looped within the proximal left ureter or dilated left renal pelvis. FL LESS THAN 1 HOUR    Result Date: 7/3/2022  Radiology exam is complete. No Radiologist dictation. Please follow up with ordering provider. XR CHEST PORTABLE    Result Date: 7/2/2022  EXAMINATION: ONE XRAY VIEW OF THE CHEST 7/2/2022 8:03 pm COMPARISON: March 8, 2022. HISTORY: ORDERING SYSTEM PROVIDED HISTORY: elevated BNP TECHNOLOGIST PROVIDED HISTORY: Reason for exam:->elevated BNP Reason for Exam: elevated BNP Additional signs and symptoms: bilateral flank pain FINDINGS: A portable upright frontal view chest radiograph was obtained. The heart size, mediastinal contour and pleural spaces are within normal limits. Minimal atelectasis is present at the left lung base. The lungs are otherwise clear.   There is no focal consolidation or pneumothorax. The pulmonary vascular pattern is within normal limits. No significant thoracic osseous abnormality. Minimal left basilar atelectasis. Otherwise, clear lungs. Assessment & Plan:      Breanna Austin is a 43 y.o. female admitted 7/2/2022 for left ureteral calculi, UTI. 1) Left Ureteral Calculi: POD #3 cystoscopy, left retrograde pyelogram/stent insertion   CT a/p 7/2/22: Acute left obstructive uropathy secondary to 3 distal left ureteral calculi each measuring 5-6 mm.  1 calculus is perched at the left UVJ. Cr 0.9   Pt will need to have intervention of the stones as an outpatient once the urine is deemed to be sterile. This may be via ureteroscopy/laser stone manipulation or ESWL. CT a/p without contrast ordered to r/o abscess  2) UTI: Urine cx negative, blood cultures NGTD   WBC 6.9, T-max 100.9 this AM   IV Cefepime started, on Rocephin prior    Will follow. Patient seen and examined, chart reviewed.      Electronically signed by Jeff Norris PA-C on 7/6/2022 at 7:56 AM

## 2022-07-06 NOTE — PROGRESS NOTES
[facility] HOSPITALIST PROGRESS NOTE      PCP: Ade Gallagher MD    Date of Admission: 7/2/2022    Subjective: Mild fever again today    [address] patient is a 51-year-old female with history of chronic hepatitis C who was admitted with left-sided flank pain. CT abdomen showed distal left ureteral calculi, urology consulted, IV fluids  Ceftriaxone started    Vitals signs: febrile, heart rate 60s to 70s range, blood pressure 147/92, on room air      Medications: Ceftriaxone, heparin, normal saline 800 mm/h    Antibiotics: Ceftriaxone day 4    Fluid status: not documented      Labs: Na 138, Cr 0.9  WBC 6.9, Hb 8.9, Plt 171     Imaging:   CT renal stone protocol showed acute left obstructive uropathy secondary to 3 distal left ureteral calculi each measuring 5.6 mm, one calculus is post at left UV junction    Assessment/Plan:     Obstructive uropathy secondary to ureterolithiasis  Acute kidney injury:   Admitted with flank pain, leukocytosis, sinus tachycardia  CT renal showed obstructive uropathy  Status post cystoscopy and stent placement  Creatinine back to baseline  Leukocytosis resolved  Cultures no growth to date  Ceftriaxone day 4, patient continues to have mild fevers   Repeat blood cultures today    History of hepatitis C: Chronic stable    DVT prophlaxis:   Heparin      Physical Exam Performed:       BP (!) 147/92   Pulse 70   Temp 100.2 °F (37.9 °C) (Oral)   Resp 18   Ht 5' 3\" (1.6 m)   Wt 170 lb 4.8 oz (77.2 kg)   LMP 06/20/2022   SpO2 99%   BMI 30.17 kg/m²     Physical Exam  Constitutional:       General: She is not in acute distress. Appearance: Normal appearance. HENT:      Head: Normocephalic and atraumatic. Right Ear: External ear normal.      Left Ear: External ear normal.   Eyes:      Extraocular Movements: Extraocular movements intact. Pupils: Pupils are equal, round, and reactive to light.    Cardiovascular:      Rate and Rhythm: Normal rate and regular rhythm. Heart sounds: No murmur heard. Pulmonary:      Effort: Pulmonary effort is normal. No respiratory distress. Breath sounds: Normal breath sounds. No wheezing. Abdominal:      General: Bowel sounds are normal. There is no distension. Palpations: Abdomen is soft. Tenderness: There is no abdominal tenderness. Musculoskeletal:         General: No swelling. Cervical back: Normal range of motion. Skin:     General: Skin is warm. Neurological:      General: No focal deficit present. Mental Status: She is alert and oriented to person, place, and time. Cranial Nerves: No cranial nerve deficit. Psychiatric:         Mood and Affect: Mood normal.       Labs:   Recent Labs     07/04/22 0146 07/05/22  0434   WBC 18.3* 11.9*   HGB 9.9* 9.1*   HCT 31.5* 28.9*    120*     Recent Labs     07/04/22 0146 07/05/22  0434    138   K 3.6 3.4*    107   CO2 22 19*   BUN 18 12   CREATININE 1.2* 1.0   CALCIUM 8.1* 7.8*     No results for input(s): AST, ALT, BILIDIR, BILITOT, ALKPHOS in the last 72 hours. No results for input(s): INR in the last 72 hours. No results for input(s): Ardelle Chalk in the last 72 hours. Urinalysis:      Lab Results   Component Value Date/Time    NITRU NEGATIVE 07/02/2022 11:05 PM    WBCUA 3 07/02/2022 11:05 PM    BACTERIA RARE 07/02/2022 11:05 PM    RBCUA 2 07/02/2022 11:05 PM    BLOODU SMALL 07/02/2022 11:05 PM    SPECGRAV 1.015 07/02/2022 11:05 PM       Radiology:  XR ABDOMEN (KUB) (SINGLE AP VIEW)   Preliminary Result   Abnormal appearance of the proximal portion of a left ureteral stent which   appears looped within the proximal left ureter or dilated left renal pelvis.          FL LESS THAN 1 HOUR   Final Result      CT ABDOMEN PELVIS WO CONTRAST Additional Contrast? None   Final Result   Acute left obstructive uropathy secondary to 3 distal left ureteral calculi   each measuring 5-6 mm.  1 calculus is perched at the left UVJ. The Loera catheter is malpositioned with the tip coiled in the vagina. Mild bibasilar airspace disease, likely atelectasis. Pneumonia not excluded. Findings were discussed with Yahir Cook at 10:49 pm on 7/2/2022. XR CHEST PORTABLE   Final Result   Minimal left basilar atelectasis. Otherwise, clear lungs.                     Cristian Monson MD  7/6/2022 5:10 AM

## 2022-07-07 VITALS
WEIGHT: 170.3 LBS | RESPIRATION RATE: 18 BRPM | SYSTOLIC BLOOD PRESSURE: 135 MMHG | HEIGHT: 63 IN | TEMPERATURE: 98.9 F | DIASTOLIC BLOOD PRESSURE: 110 MMHG | OXYGEN SATURATION: 98 % | BODY MASS INDEX: 30.18 KG/M2 | HEART RATE: 75 BPM

## 2022-07-07 LAB
ANION GAP SERPL CALCULATED.3IONS-SCNC: 11 MMOL/L (ref 4–16)
BUN BLDV-MCNC: 9 MG/DL (ref 6–23)
CALCIUM SERPL-MCNC: 8.9 MG/DL (ref 8.3–10.6)
CHLORIDE BLD-SCNC: 98 MMOL/L (ref 99–110)
CO2: 26 MMOL/L (ref 21–32)
CREAT SERPL-MCNC: 0.8 MG/DL (ref 0.6–1.1)
DIFFERENTIAL TYPE: ABNORMAL
EOSINOPHILS ABSOLUTE: 0.1 K/CU MM
EOSINOPHILS RELATIVE PERCENT: 1 % (ref 0–3)
GFR AFRICAN AMERICAN: >60 ML/MIN/1.73M2
GFR NON-AFRICAN AMERICAN: >60 ML/MIN/1.73M2
GLUCOSE BLD-MCNC: 154 MG/DL (ref 70–99)
HCT VFR BLD CALC: 32.7 % (ref 37–47)
HEMOGLOBIN: 9.6 GM/DL (ref 12.5–16)
LYMPHOCYTES ABSOLUTE: 2.5 K/CU MM
LYMPHOCYTES RELATIVE PERCENT: 26 % (ref 24–44)
MAGNESIUM: 1.4 MG/DL (ref 1.8–2.4)
MCH RBC QN AUTO: 27.1 PG (ref 27–31)
MCHC RBC AUTO-ENTMCNC: 29.4 % (ref 32–36)
MCV RBC AUTO: 92.4 FL (ref 78–100)
MONOCYTES ABSOLUTE: 1.3 K/CU MM
MONOCYTES RELATIVE PERCENT: 14 % (ref 0–4)
PDW BLD-RTO: 14.4 % (ref 11.7–14.9)
PLATELET # BLD: 216 K/CU MM (ref 140–440)
PMV BLD AUTO: 12.2 FL (ref 7.5–11.1)
POTASSIUM SERPL-SCNC: 3.2 MMOL/L (ref 3.5–5.1)
RBC # BLD: 3.54 M/CU MM (ref 4.2–5.4)
SEGMENTED NEUTROPHILS ABSOLUTE COUNT: 5.7 K/CU MM
SEGMENTED NEUTROPHILS RELATIVE PERCENT: 59 % (ref 36–66)
SODIUM BLD-SCNC: 135 MMOL/L (ref 135–145)
WBC # BLD: 9.6 K/CU MM (ref 4–10.5)

## 2022-07-07 PROCEDURE — 83735 ASSAY OF MAGNESIUM: CPT

## 2022-07-07 PROCEDURE — 2580000003 HC RX 258: Performed by: UROLOGY

## 2022-07-07 PROCEDURE — 6360000002 HC RX W HCPCS: Performed by: UROLOGY

## 2022-07-07 PROCEDURE — 6360000002 HC RX W HCPCS: Performed by: HOSPITALIST

## 2022-07-07 PROCEDURE — 76937 US GUIDE VASCULAR ACCESS: CPT

## 2022-07-07 PROCEDURE — 85027 COMPLETE CBC AUTOMATED: CPT

## 2022-07-07 PROCEDURE — 6370000000 HC RX 637 (ALT 250 FOR IP): Performed by: UROLOGY

## 2022-07-07 PROCEDURE — 36415 COLL VENOUS BLD VENIPUNCTURE: CPT

## 2022-07-07 PROCEDURE — 80048 BASIC METABOLIC PNL TOTAL CA: CPT

## 2022-07-07 PROCEDURE — 6370000000 HC RX 637 (ALT 250 FOR IP): Performed by: HOSPITALIST

## 2022-07-07 PROCEDURE — 2580000003 HC RX 258: Performed by: HOSPITALIST

## 2022-07-07 PROCEDURE — 94761 N-INVAS EAR/PLS OXIMETRY MLT: CPT

## 2022-07-07 PROCEDURE — 85007 BL SMEAR W/DIFF WBC COUNT: CPT

## 2022-07-07 PROCEDURE — 6370000000 HC RX 637 (ALT 250 FOR IP): Performed by: PHYSICIAN ASSISTANT

## 2022-07-07 RX ORDER — MAGNESIUM SULFATE 4 G/50ML
4000 INJECTION INTRAVENOUS ONCE
Status: COMPLETED | OUTPATIENT
Start: 2022-07-07 | End: 2022-07-07

## 2022-07-07 RX ORDER — OXYBUTYNIN CHLORIDE 5 MG/1
5 TABLET ORAL 3 TIMES DAILY
Status: DISCONTINUED | OUTPATIENT
Start: 2022-07-07 | End: 2022-07-07 | Stop reason: HOSPADM

## 2022-07-07 RX ORDER — POTASSIUM CHLORIDE 20 MEQ/1
40 TABLET, EXTENDED RELEASE ORAL ONCE
Status: COMPLETED | OUTPATIENT
Start: 2022-07-07 | End: 2022-07-07

## 2022-07-07 RX ADMIN — HEPARIN SODIUM 5000 UNITS: 5000 INJECTION INTRAVENOUS; SUBCUTANEOUS at 06:16

## 2022-07-07 RX ADMIN — MAGNESIUM SULFATE HEPTAHYDRATE 4000 MG: 4 INJECTION, SOLUTION INTRAVENOUS at 11:06

## 2022-07-07 RX ADMIN — HYDROMORPHONE HYDROCHLORIDE 1 MG: 1 INJECTION, SOLUTION INTRAMUSCULAR; INTRAVENOUS; SUBCUTANEOUS at 09:11

## 2022-07-07 RX ADMIN — SODIUM CHLORIDE, PRESERVATIVE FREE 10 ML: 5 INJECTION INTRAVENOUS at 09:39

## 2022-07-07 RX ADMIN — OXYBUTYNIN CHLORIDE 5 MG: 5 TABLET ORAL at 15:16

## 2022-07-07 RX ADMIN — SODIUM CHLORIDE, PRESERVATIVE FREE 10 ML: 5 INJECTION INTRAVENOUS at 01:54

## 2022-07-07 RX ADMIN — CEFEPIME 2000 MG: 2 INJECTION, POWDER, FOR SOLUTION INTRAVENOUS at 09:49

## 2022-07-07 RX ADMIN — OXYBUTYNIN CHLORIDE 5 MG: 5 TABLET ORAL at 09:57

## 2022-07-07 RX ADMIN — OXYCODONE AND ACETAMINOPHEN 1 TABLET: 5; 325 TABLET ORAL at 00:41

## 2022-07-07 RX ADMIN — HYDROMORPHONE HYDROCHLORIDE 1 MG: 1 INJECTION, SOLUTION INTRAMUSCULAR; INTRAVENOUS; SUBCUTANEOUS at 02:52

## 2022-07-07 RX ADMIN — OXYCODONE AND ACETAMINOPHEN 1 TABLET: 5; 325 TABLET ORAL at 11:17

## 2022-07-07 RX ADMIN — POTASSIUM CHLORIDE 40 MEQ: 20 TABLET, EXTENDED RELEASE ORAL at 09:57

## 2022-07-07 RX ADMIN — HEPARIN SODIUM 5000 UNITS: 5000 INJECTION INTRAVENOUS; SUBCUTANEOUS at 15:17

## 2022-07-07 ASSESSMENT — PAIN SCALES - GENERAL
PAINLEVEL_OUTOF10: 6
PAINLEVEL_OUTOF10: 9
PAINLEVEL_OUTOF10: 10
PAINLEVEL_OUTOF10: 7
PAINLEVEL_OUTOF10: 9

## 2022-07-07 ASSESSMENT — PAIN SCALES - WONG BAKER
WONGBAKER_NUMERICALRESPONSE: 0

## 2022-07-07 ASSESSMENT — PAIN DESCRIPTION - DESCRIPTORS
DESCRIPTORS: SHARP;SHOOTING
DESCRIPTORS: ACHING

## 2022-07-07 ASSESSMENT — PAIN DESCRIPTION - LOCATION
LOCATION: ABDOMEN;BACK
LOCATION: BACK

## 2022-07-07 ASSESSMENT — PAIN DESCRIPTION - ONSET: ONSET: ON-GOING

## 2022-07-07 ASSESSMENT — PAIN DESCRIPTION - PAIN TYPE: TYPE: SURGICAL PAIN

## 2022-07-07 ASSESSMENT — PAIN DESCRIPTION - FREQUENCY: FREQUENCY: CONTINUOUS

## 2022-07-07 ASSESSMENT — PAIN DESCRIPTION - ORIENTATION
ORIENTATION: LOWER;LEFT;RIGHT
ORIENTATION: MID;LEFT

## 2022-07-07 ASSESSMENT — PAIN - FUNCTIONAL ASSESSMENT: PAIN_FUNCTIONAL_ASSESSMENT: ACTIVITIES ARE NOT PREVENTED

## 2022-07-07 NOTE — PROGRESS NOTES
Patient discharged to home, given meds per pharmacy. Reviewed AVS with patient, signed and verbalized understanding.

## 2022-07-07 NOTE — DISCHARGE SUMMARY
Discharge Summary    Name:  Cristela Mcburney /Age/Sex: 1980  (43 y.o. female)   MRN & CSN:  [id number] & [id number] Admission Date/Time: 2022  7:12 PM   Attending:  Tone Coker MD Discharging Physician: Tone Coker MD     Hospital Course:   Cristela Mcburney is a 43 y.o.  female  who presents with Flank pain     40-year-old female with history of chronic hepatitis C who was admitted with left-sided flank pain. CT abdomen showed distal left ureteral calculi, urology consulted, IV fluids  Started on IV antibiotic  Patient presented with leukocytosis and tachycardia spiking fever consistent with a sepsis  Underwent cystoscopy and stent placement  Leukocytosis resolved  Creatinine back to baseline  Blood culture and urine culture showing no growth  Patient was switched from IV ceftriaxone to IV cefepime during hospital stay due to spiking fever  Repeated blood culture on  negative  No further fever   Pt will need to have intervention of the stones as an outpatient once the urine is deemed to be sterile. This may be via ureteroscopy/laser stone manipulation or ESWL. Urologist cleared the patient to discharge home on cefdinir 300 mg twice daily for 10 days  Replace hypokalemia and hypomagnesemia before discharge      The patient expressed appropriate understanding of and agreement with the discharge recommendations, medications, and plan. Consults this admission:  [id number] TO HOSPITALIST  IP CONSULT TO IV TEAM    Discharge Instruction:   Follow up appointments:  Follow-up with urology office in 1 to 2-week  Primary care physician:  within 1 weeks    Diet:  regular diet   Activity: activity as tolerated  Disposition: Discharged to:   [x]Home, [][facility], []SNF, []Acute Rehab, []Hospice   Condition on discharge: Stable    Discharge Medications:        Medication List      START taking these medications    cefdinir 300 MG capsule  Commonly known as: OMNICEF  Take 1 capsule by mouth 2 times daily for 7 days     oxyCODONE-acetaminophen 5-325 MG per tablet  Commonly known as: PERCOCET  Take 1 tablet by mouth every 8 hours as needed for Pain for up to 3 days. CONTINUE taking these medications    butalbital-APAP-caffeine -40 MG Caps per capsule  Commonly known as: Fioricet  Take 1 capsule by mouth every 4 hours as needed for Headaches     ondansetron 4 MG disintegrating tablet  Commonly known as: Zofran ODT  Take 1 tablet by mouth every 8 hours as needed for Nausea or Vomiting        STOP taking these medications    naproxen 500 MG tablet  Commonly known as: Naprosyn           Where to Get Your Medications      These medications were sent to [address] [place] [address], [address]     Phone: [phone]   · cefdinir 300 MG capsule     You can get these medications from any pharmacy    Bring a paper prescription for each of these medications  · oxyCODONE-acetaminophen 5-325 MG per tablet         Objective Findings at Discharge:   BP (!) 135/110   Pulse 75   Temp 98.9 °F (37.2 °C) (Oral)   Resp 16   Ht 5' 3\" (1.6 m)   Wt 170 lb 4.8 oz (77.2 kg)   LMP 06/20/2022   SpO2 98%   BMI 30.17 kg/m²            PHYSICAL EXAM   GEN Awake female, sitting upright in bed in no apparent distress. Appears given age. EYES Pupils are equally round. No scleral erythema, discharge, or conjunctivitis. HENT Mucous membranes are moist. Oral pharynx without exudates, no evidence of thrush. NECK Supple, no apparent thyromegaly or masses. RESP Clear to auscultation, no wheezes, rales or rhonchi. Symmetric chest movement while on room air. CARDIO/VASC S1/S2 auscultated. Regular rate without appreciable murmurs, rubs, or gallops. No JVD or carotid bruits. Peripheral pulses equal bilaterally and palpable. No peripheral edema.   GI Abdomen is soft without significant tenderness, masses, or guarding. Bowel sounds are normoactive. Rectal exam deferred. MSK No gross joint deformities. SKIN Normal coloration, warm, dry. NEURO Cranial nerves appear grossly intact, normal speech, no lateralizing weakness. PSYCH Awake, alert, oriented x 4. Affect appropriate.     BMP/CBC  Recent Labs     07/05/22  0434 07/06/22  0422 07/07/22  0217    138 135   K 3.4* 3.7 3.2*    104 98*   CO2 19* 25 26   BUN 12 7 9   CREATININE 1.0 0.9 0.8   WBC 11.9* 6.9 9.6   HCT 28.9* 28.5* 32.7*   * 171 216       IMAGING:      Discharge Time of 35  minutes    Electronically signed by Lissette Harvey MD on 7/7/2022 at 10:40 AM

## 2022-07-07 NOTE — CARE COORDINATION
Received a call from Paulina in the outpt pharmacy regarding pt's d/c medications. Pt has "[employer]" and [employer] insurance and we have not helped pt before. Paulina confirmed that pt had surgery. Voucher created for Percocet 3 day supply only, Cefdinir and faxed to the pharmacy.  Pt will go on the flag list. If she would need any further assistance, she must fill out an application and provide required documentation to be considered for eligibility. Dalia Martin

## 2022-07-07 NOTE — PROGRESS NOTES
Physician Progress Note      Toro Nevarez  CSN #:                  [id number]  :                       1980  ADMIT DATE:       2022 7:12 PM  [address] [place] DATE:  RESPONDING  PROVIDER #:        Juarez Salinas MD          QUERY TEXT:    Pt admitted with nephrolithiasis. Pt noted to have leukocytosis, tachycardia,   tachypnea, febrile. If possible, please document in the progress notes and   discharge summary if you are evaluating and /or treating any of the following: The medical record reflects the following:  Risk Factors: nephrolithiasis  Clinical Indicators: On admission : WBC 27.2, , resp 22, Tmax 99.2. On  WBC 11.9, HR 75, resp 18, Tmax 100.8.  Tmax 100.9.   Treatment: labs, IVF, IV RocephiDEVON SpearsN, RN, [facility]  Clinical   [phone]  Options provided:  -- Sepsis, present on admission  -- Sepsis, not present on admission  -- Sepsis was ruled out  -- Other - I will add my own diagnosis  -- Disagree - Not applicable / Not valid  -- Disagree - Clinically unable to determine / Unknown  -- Refer to Clinical Documentation Reviewer    PROVIDER RESPONSE TEXT:    The patient develop sepsis but not sure if it is at admission    Query created by: Arlyn Rico on 2022 11:45 AM      Electronically signed by:  Juarez Salinas MD 2022 11:12 AM

## 2022-07-07 NOTE — PROGRESS NOTES
[facility], [address]   Progress Note  [facility] 0 1 2      Date: 2022   Patient: Evans Bloch   : 1980   DOA: 2022   MRN: [id number]   ROOM#: 6491/[id number]     Admit Date: 2022     Collaborating Urologist on Call at time of admission: Dr. Ramo Lutz  CC: Flank pain since yesterday. states she has defective kidney's and had surgery in   Reason for Consult:  Left ureteral stones  POD #4: Cystoscopy, left retrograde pyelogram/stent insertion    Subjective:     Pain: Moderate, no nausea and no vomiting,   Bowel Movement/Flatus:   Yes  Voiding:  easily    Pt resting in bed, endorses pain in her left flank.     Objective:    Vitals:    /80   Pulse 66   Temp 98.7 °F (37.1 °C) (Oral)   Resp 16   Ht 5' 3\" (1.6 m)   Wt 170 lb 4.8 oz (77.2 kg)   LMP 2022   SpO2 99%   BMI 30.17 kg/m²    Temp  Av.9 °F (37.2 °C)  Min: 98 °F (36.7 °C)  Max: 100.9 °F (38.3 °C)   No intake or output data in the 24 hours ending 22 0753    Physical Exam:   General appearance: alert, appears stated age, cooperative, no distress and mildly obese  Head: Normocephalic, without obvious abnormality, atraumatic  Back: Left CVA tenderness, mild right CVA tenderness  Abdomen: Soft, non-distended, TTP in LLQ    Labs:   WBC:    Lab Results   Component Value Date/Time    WBC 9.6 2022 02:17 AM      Hemoglobin/Hematocrit:    Lab Results   Component Value Date/Time    HGB 9.6 2022 02:17 AM    HCT 32.7 2022 02:17 AM      BMP:   Lab Results   Component Value Date/Time     2022 02:17 AM    K 3.2 2022 02:17 AM    CL 98 2022 02:17 AM    CO2 26 2022 02:17 AM    BUN 9 2022 02:17 AM    LABALBU 4.1 2022 07:51 PM    CREATININE 0.8 2022 02:17 AM    CALCIUM 8.9 2022 02:17 AM    GFRAA >60 2022 02:17 AM    LABGLOM >60 2022 02:17 AM     Blood Culture: NGTD  Urine Culture: No growth at 18 to 36 hours     Imaging:  CT Large bowel otherwise unremarkable. Appendix unremarkable. Distal esophagus and stomach are unremarkable appearance. Duodenal sweep and the remainder of the small bowel are unremarkable. Pelvis: Uterus and adnexa regions unremarkable. No free pelvic fluid is found. Urinary bladder unremarkable. Peritoneum/Retroperitoneum: Abdominal aorta normal in caliber. Shotty retroperitoneal lymph nodes are seen which are likely reactive. Bones/Soft Tissues: No acute or suspicious bony abnormalities are identified. Interval placement of a left ureteral stent. Hydronephrosis on the left has decreased considerably and is nearly resolved. Perinephric and periureteral fat stranding on the left have decreased. 3 small calculi are seen adjacent to the stent in the distal left ureter, decreased in size when compared to the previous exam. There has been interval development of perinephric fat stranding the right, without hydronephrosis and without an obstructive calculus. Findings raise the possibility of right-sided pyelonephritis. Small bilateral pleural effusions have developed, with adjacent airspace disease. The airspace disease may reflect passive atelectasis, but pneumonia and aspiration remain in the differential. RECOMMENDATIONS: Unavailable       CT ABDOMEN PELVIS WO CONTRAST Additional Contrast? None    Result Date: 7/2/2022  EXAMINATION: STONE PROTOCOL CT OF THE ABDOMEN AND PELVIS 7/2/2022 9:39 pm TECHNIQUE: CT of the abdomen and pelvis was performed without the administration of intravenous contrast. Multiplanar reformatted images are provided for review. Automated exposure control, iterative reconstruction, and/or weight based adjustment of the mA/kV was utilized to reduce the radiation dose to as low as reasonably achievable. COMPARISON: None.  HISTORY: ORDERING SYSTEM PROVIDED HISTORY: abdominal pain, urinary retention TECHNOLOGIST PROVIDED HISTORY: Reason for exam:->abdominal pain, urinary retention Additional Contrast?->None Is the patient pregnant?->No Reason for Exam: abdominal pain, urinary retention FINDINGS: LOWER CHEST: There is mild bibasilar airspace disease. KIDNEYS AND URINARY TRACT: There is moderate left hydronephrosis and hydroureter secondary to 3 obstructing calculi in the distal left ureter each measuring approximately 5-6 mm.  1 calculus is perched at the left UVJ (axial image [id number]). The left kidney is enlarged and there is prominent left perinephric and periureteral fat stranding. No other renal calculi. ORGANS: Visualized portions of the unenhanced liver, spleen, pancreas, and adrenal glands demonstrate no acute abnormality. There are no calcified gallstones. No inflammatory changes in the visualized portion of the gallbladder fossa. GI/BOWEL: The appendix is normal.  Unopacified bowel loops are unremarkable. No bowel obstruction. PELVIS: There is a Loera catheter coiled in the vagina including the inflated balloon tip. The uterus and ovaries are unremarkable. PERITONEUM/RETROPERITONEUM: There is left retroperitoneal fat stranding. There is a small volume of free fluid in the cul-de-sac. There is no free air or adenopathy. BONES/SOFT TISSUES: The osseous structures demonstrate no acute abnormality. Acute left obstructive uropathy secondary to 3 distal left ureteral calculi each measuring 5-6 mm.  1 calculus is perched at the left UVJ. The Loera catheter is malpositioned with the tip coiled in the vagina. Mild bibasilar airspace disease, likely atelectasis. Pneumonia not excluded. Findings were discussed with Katherine Rubinstein at 10:49 pm on 7/2/2022. XR ABDOMEN (KUB) (SINGLE AP VIEW)    Result Date: 7/3/2022  EXAMINATION: ONE SUPINE XRAY VIEW(S) OF THE ABDOMEN 7/3/2022 10:29 am COMPARISON: CT abdomen and pelvis 07/02/2022 HISTORY: ORDERING SYSTEM PROVIDED HISTORY: left distal ureteral stones visible?  TECHNOLOGIST PROVIDED HISTORY: Reason for exam:->left distal ureteral stones visible? Reason for Exam: left distal ureteral stones visible? FINDINGS: There has been interval placement of a left ureteral stent. The proximal portion of the stent appears looped within the proximal ureter or dilated left renal pelvis. The distal portion of the stent is normal in configuration. No abnormally dilated loops of bowel are identified. Abnormal appearance of the proximal portion of a left ureteral stent which appears looped within the proximal left ureter or dilated left renal pelvis. FL LESS THAN 1 HOUR    Result Date: 7/3/2022  Radiology exam is complete. No Radiologist dictation. Please follow up with ordering provider. XR CHEST PORTABLE    Result Date: 7/2/2022  EXAMINATION: ONE XRAY VIEW OF THE CHEST 7/2/2022 8:03 pm COMPARISON: March 8, 2022. HISTORY: ORDERING SYSTEM PROVIDED HISTORY: elevated BNP TECHNOLOGIST PROVIDED HISTORY: Reason for exam:->elevated BNP Reason for Exam: elevated BNP Additional signs and symptoms: bilateral flank pain FINDINGS: A portable upright frontal view chest radiograph was obtained. The heart size, mediastinal contour and pleural spaces are within normal limits. Minimal atelectasis is present at the left lung base. The lungs are otherwise clear. There is no focal consolidation or pneumothorax. The pulmonary vascular pattern is within normal limits. No significant thoracic osseous abnormality. Minimal left basilar atelectasis. Otherwise, clear lungs. Assessment & Plan:      Rhonda Javier is a 43 y.o. female admitted 7/2/2022 for left ureteral calculi, UTI. 1) Left Ureteral Calculi: POD #4 cystoscopy, left retrograde pyelogram/stent insertion   CT a/p 7/6/22: Interval placement of a left ureteral stent. Hydronephrosis on the left has decreased considerably and is nearly resolved. Perinephric and periureteral fat stranding on the left have decreased.  3 small calculi are seen adjacent to the stent in the distal left ureter, decreased in size when compared to the previous exam. There has been interval development of perinephric fat stranding the right, without hydronephrosis and without an obstructive calculus. CT a/p 7/2/22: Acute left obstructive uropathy secondary to 3 distal left ureteral calculi each measuring 5-6 mm.  1 calculus is perched at the left UVJ. Cr 0.8   Start Ditropan 5mg TID for stent discomfort   Pt will need to have intervention of the stones as an outpatient once the urine is deemed to be sterile. This may be via ureteroscopy/laser stone manipulation or ESWL. 2) Acute Pyelonephritis: Urine cx negative, blood cultures NGTD   Blood cultures NGTD   WBC 9.6, currently afebrile. T-max 100.9 yesterday morning. On IV Cefepime; recommend discharge on Cefdinir 300mg BID x10 days. Pt stable from a  standpoint. Will sign off, please call with any questions. Pt to follow up in our office in 1 week for urine check and scheduling of next procedure. Patient seen and examined, chart reviewed.      Electronically signed by Edwin Monzon PA-C on 7/7/2022 at 7:53 AM

## 2022-07-07 NOTE — PROGRESS NOTES
Outpatient Pharmacy Progress Note for Meds-to-Beds    Total number of Prescriptions Filled: 2  The following medications were dispensed to the patient during the discharge process:   Cefdinir   oxyCODONE-acetaminophen    Additional Documentation:   Medication(s) were delivered to the patient's room prior to discharge      Thank you for letting us serve your patients.   [facility]    [address], [address] [facility]    Phone: [phone]    Fax: [phone]

## 2022-07-08 LAB
CULTURE: NORMAL
CULTURE: NORMAL
Lab: NORMAL
Lab: NORMAL
SPECIMEN: NORMAL
SPECIMEN: NORMAL

## 2022-07-11 LAB
CULTURE: NORMAL
CULTURE: NORMAL
Lab: NORMAL
Lab: NORMAL
SPECIMEN: NORMAL
SPECIMEN: NORMAL

## 2022-07-27 ENCOUNTER — ANESTHESIA EVENT (OUTPATIENT)
Dept: OPERATING ROOM | Age: 42
End: 2022-07-27
Payer: COMMERCIAL

## 2022-07-27 ENCOUNTER — ANESTHESIA (OUTPATIENT)
Dept: OPERATING ROOM | Age: 42
End: 2022-07-27
Payer: COMMERCIAL

## 2022-07-27 ENCOUNTER — APPOINTMENT (OUTPATIENT)
Dept: GENERAL RADIOLOGY | Age: 42
End: 2022-07-27
Attending: UROLOGY
Payer: COMMERCIAL

## 2022-07-27 ENCOUNTER — HOSPITAL ENCOUNTER (OUTPATIENT)
Age: 42
Setting detail: OUTPATIENT SURGERY
Discharge: HOME | End: 2022-07-27
Attending: UROLOGY | Admitting: UROLOGY
Payer: COMMERCIAL

## 2022-07-27 VITALS
HEART RATE: 50 BPM | DIASTOLIC BLOOD PRESSURE: 81 MMHG | TEMPERATURE: 97.8 F | SYSTOLIC BLOOD PRESSURE: 124 MMHG | OXYGEN SATURATION: 99 % | RESPIRATION RATE: 16 BRPM

## 2022-07-27 DIAGNOSIS — N20.1 URETERAL STONE: ICD-10-CM

## 2022-07-27 LAB
GLUCOSE BLD-MCNC: 121 MG/DL (ref 70–99)
GLUCOSE BLD-MCNC: 93 MG/DL (ref 70–99)
PREGNANCY TEST URINE, POC: NEGATIVE

## 2022-07-27 PROCEDURE — 3700000000 HC ANESTHESIA ATTENDED CARE: Performed by: UROLOGY

## 2022-07-27 PROCEDURE — C1769 GUIDE WIRE: HCPCS | Performed by: UROLOGY

## 2022-07-27 PROCEDURE — 2709999900 HC NON-CHARGEABLE SUPPLY: Performed by: UROLOGY

## 2022-07-27 PROCEDURE — 7100000000 HC PACU RECOVERY - FIRST 15 MIN: Performed by: UROLOGY

## 2022-07-27 PROCEDURE — 3700000001 HC ADD 15 MINUTES (ANESTHESIA): Performed by: UROLOGY

## 2022-07-27 PROCEDURE — 6370000000 HC RX 637 (ALT 250 FOR IP): Performed by: ANESTHESIOLOGY

## 2022-07-27 PROCEDURE — 3600000003 HC SURGERY LEVEL 3 BASE: Performed by: UROLOGY

## 2022-07-27 PROCEDURE — 7100000011 HC PHASE II RECOVERY - ADDTL 15 MIN: Performed by: UROLOGY

## 2022-07-27 PROCEDURE — 81025 URINE PREGNANCY TEST: CPT

## 2022-07-27 PROCEDURE — 76000 FLUOROSCOPY <1 HR PHYS/QHP: CPT

## 2022-07-27 PROCEDURE — 2580000003 HC RX 258: Performed by: ANESTHESIOLOGY

## 2022-07-27 PROCEDURE — 7100000001 HC PACU RECOVERY - ADDTL 15 MIN: Performed by: UROLOGY

## 2022-07-27 PROCEDURE — C1758 CATHETER, URETERAL: HCPCS | Performed by: UROLOGY

## 2022-07-27 PROCEDURE — 6360000002 HC RX W HCPCS: Performed by: UROLOGY

## 2022-07-27 PROCEDURE — 2500000003 HC RX 250 WO HCPCS

## 2022-07-27 PROCEDURE — 82962 GLUCOSE BLOOD TEST: CPT

## 2022-07-27 PROCEDURE — 6360000002 HC RX W HCPCS

## 2022-07-27 PROCEDURE — 6360000002 HC RX W HCPCS: Performed by: ANESTHESIOLOGY

## 2022-07-27 PROCEDURE — 2720000010 HC SURG SUPPLY STERILE: Performed by: UROLOGY

## 2022-07-27 PROCEDURE — 2580000003 HC RX 258: Performed by: UROLOGY

## 2022-07-27 PROCEDURE — 2580000003 HC RX 258

## 2022-07-27 PROCEDURE — 88300 SURGICAL PATH GROSS: CPT | Performed by: PATHOLOGY

## 2022-07-27 PROCEDURE — 3600000013 HC SURGERY LEVEL 3 ADDTL 15MIN: Performed by: UROLOGY

## 2022-07-27 PROCEDURE — 7100000010 HC PHASE II RECOVERY - FIRST 15 MIN: Performed by: UROLOGY

## 2022-07-27 PROCEDURE — 82360 CALCULUS ASSAY QUANT: CPT

## 2022-07-27 RX ORDER — FLUMAZENIL 0.1 MG/ML
INJECTION, SOLUTION INTRAVENOUS PRN
Status: DISCONTINUED | OUTPATIENT
Start: 2022-07-27 | End: 2022-07-27 | Stop reason: SDUPTHER

## 2022-07-27 RX ORDER — ONDANSETRON 2 MG/ML
4 INJECTION INTRAMUSCULAR; INTRAVENOUS
Status: DISCONTINUED | OUTPATIENT
Start: 2022-07-27 | End: 2022-07-27 | Stop reason: HOSPADM

## 2022-07-27 RX ORDER — ONDANSETRON 2 MG/ML
INJECTION INTRAMUSCULAR; INTRAVENOUS PRN
Status: DISCONTINUED | OUTPATIENT
Start: 2022-07-27 | End: 2022-07-27 | Stop reason: SDUPTHER

## 2022-07-27 RX ORDER — ROCURONIUM BROMIDE 10 MG/ML
INJECTION, SOLUTION INTRAVENOUS PRN
Status: DISCONTINUED | OUTPATIENT
Start: 2022-07-27 | End: 2022-07-27 | Stop reason: SDUPTHER

## 2022-07-27 RX ORDER — FENTANYL CITRATE 50 UG/ML
50 INJECTION, SOLUTION INTRAMUSCULAR; INTRAVENOUS EVERY 5 MIN PRN
Status: DISCONTINUED | OUTPATIENT
Start: 2022-07-27 | End: 2022-07-27 | Stop reason: HOSPADM

## 2022-07-27 RX ORDER — SODIUM CHLORIDE 9 MG/ML
25 INJECTION, SOLUTION INTRAVENOUS PRN
Status: DISCONTINUED | OUTPATIENT
Start: 2022-07-27 | End: 2022-07-27 | Stop reason: HOSPADM

## 2022-07-27 RX ORDER — LIDOCAINE HYDROCHLORIDE 20 MG/ML
INJECTION, SOLUTION EPIDURAL; INFILTRATION; INTRACAUDAL; PERINEURAL PRN
Status: DISCONTINUED | OUTPATIENT
Start: 2022-07-27 | End: 2022-07-27 | Stop reason: SDUPTHER

## 2022-07-27 RX ORDER — DEXAMETHASONE SODIUM PHOSPHATE 4 MG/ML
INJECTION, SOLUTION INTRA-ARTICULAR; INTRALESIONAL; INTRAMUSCULAR; INTRAVENOUS; SOFT TISSUE PRN
Status: DISCONTINUED | OUTPATIENT
Start: 2022-07-27 | End: 2022-07-27 | Stop reason: SDUPTHER

## 2022-07-27 RX ORDER — HYDRALAZINE HYDROCHLORIDE 20 MG/ML
10 INJECTION INTRAMUSCULAR; INTRAVENOUS
Status: DISCONTINUED | OUTPATIENT
Start: 2022-07-27 | End: 2022-07-27 | Stop reason: HOSPADM

## 2022-07-27 RX ORDER — MIDAZOLAM HYDROCHLORIDE 1 MG/ML
INJECTION INTRAMUSCULAR; INTRAVENOUS PRN
Status: DISCONTINUED | OUTPATIENT
Start: 2022-07-27 | End: 2022-07-27 | Stop reason: SDUPTHER

## 2022-07-27 RX ORDER — SODIUM CHLORIDE 0.9 % (FLUSH) 0.9 %
5-40 SYRINGE (ML) INJECTION EVERY 12 HOURS SCHEDULED
Status: DISCONTINUED | OUTPATIENT
Start: 2022-07-27 | End: 2022-07-27 | Stop reason: HOSPADM

## 2022-07-27 RX ORDER — PROPOFOL 10 MG/ML
INJECTION, EMULSION INTRAVENOUS PRN
Status: DISCONTINUED | OUTPATIENT
Start: 2022-07-27 | End: 2022-07-27 | Stop reason: SDUPTHER

## 2022-07-27 RX ORDER — HYDROCODONE BITARTRATE AND ACETAMINOPHEN 5; 325 MG/1; MG/1
1 TABLET ORAL EVERY 8 HOURS PRN
Qty: 10 TABLET | Refills: 0 | Status: SHIPPED | OUTPATIENT
Start: 2022-07-27 | End: 2022-07-30

## 2022-07-27 RX ORDER — SODIUM CHLORIDE, SODIUM LACTATE, POTASSIUM CHLORIDE, CALCIUM CHLORIDE 600; 310; 30; 20 MG/100ML; MG/100ML; MG/100ML; MG/100ML
INJECTION, SOLUTION INTRAVENOUS CONTINUOUS PRN
Status: DISCONTINUED | OUTPATIENT
Start: 2022-07-27 | End: 2022-07-27 | Stop reason: SDUPTHER

## 2022-07-27 RX ORDER — LABETALOL HYDROCHLORIDE 5 MG/ML
10 INJECTION, SOLUTION INTRAVENOUS
Status: DISCONTINUED | OUTPATIENT
Start: 2022-07-27 | End: 2022-07-27 | Stop reason: HOSPADM

## 2022-07-27 RX ORDER — ONDANSETRON 4 MG/1
4 TABLET, FILM COATED ORAL EVERY 12 HOURS PRN
Qty: 8 TABLET | Refills: 1 | Status: SHIPPED | OUTPATIENT
Start: 2022-07-27

## 2022-07-27 RX ORDER — SODIUM CHLORIDE, SODIUM LACTATE, POTASSIUM CHLORIDE, CALCIUM CHLORIDE 600; 310; 30; 20 MG/100ML; MG/100ML; MG/100ML; MG/100ML
INJECTION, SOLUTION INTRAVENOUS CONTINUOUS
Status: DISCONTINUED | OUTPATIENT
Start: 2022-07-27 | End: 2022-07-27 | Stop reason: HOSPADM

## 2022-07-27 RX ORDER — OXYCODONE HYDROCHLORIDE 5 MG/1
5 TABLET ORAL PRN
Status: COMPLETED | OUTPATIENT
Start: 2022-07-27 | End: 2022-07-27

## 2022-07-27 RX ORDER — FENTANYL CITRATE 50 UG/ML
25 INJECTION, SOLUTION INTRAMUSCULAR; INTRAVENOUS EVERY 5 MIN PRN
Status: DISCONTINUED | OUTPATIENT
Start: 2022-07-27 | End: 2022-07-27 | Stop reason: HOSPADM

## 2022-07-27 RX ORDER — HYDROCODONE BITARTRATE AND ACETAMINOPHEN 5; 325 MG/1; MG/1
1 TABLET ORAL EVERY 6 HOURS PRN
COMMUNITY

## 2022-07-27 RX ORDER — SODIUM CHLORIDE 0.9 % (FLUSH) 0.9 %
5-40 SYRINGE (ML) INJECTION PRN
Status: DISCONTINUED | OUTPATIENT
Start: 2022-07-27 | End: 2022-07-27 | Stop reason: HOSPADM

## 2022-07-27 RX ORDER — OXYCODONE HYDROCHLORIDE 5 MG/1
10 TABLET ORAL PRN
Status: COMPLETED | OUTPATIENT
Start: 2022-07-27 | End: 2022-07-27

## 2022-07-27 RX ORDER — FENTANYL CITRATE 50 UG/ML
INJECTION, SOLUTION INTRAMUSCULAR; INTRAVENOUS PRN
Status: DISCONTINUED | OUTPATIENT
Start: 2022-07-27 | End: 2022-07-27 | Stop reason: SDUPTHER

## 2022-07-27 RX ADMIN — OXYCODONE 5 MG: 5 TABLET ORAL at 14:54

## 2022-07-27 RX ADMIN — FENTANYL CITRATE 50 MCG: 50 INJECTION, SOLUTION INTRAMUSCULAR; INTRAVENOUS at 14:12

## 2022-07-27 RX ADMIN — FENTANYL CITRATE 50 MCG: 50 INJECTION, SOLUTION INTRAMUSCULAR; INTRAVENOUS at 13:09

## 2022-07-27 RX ADMIN — MIDAZOLAM 2 MG: 1 INJECTION INTRAMUSCULAR; INTRAVENOUS at 13:05

## 2022-07-27 RX ADMIN — DEXMEDETOMIDINE 10 MCG: 100 INJECTION, SOLUTION, CONCENTRATE INTRAVENOUS at 13:28

## 2022-07-27 RX ADMIN — ONDANSETRON 4 MG: 2 INJECTION INTRAMUSCULAR; INTRAVENOUS at 13:43

## 2022-07-27 RX ADMIN — FLUMAZENIL 0.5 MG: 0.1 INJECTION, SOLUTION INTRAVENOUS at 13:45

## 2022-07-27 RX ADMIN — FENTANYL CITRATE 50 MCG: 50 INJECTION, SOLUTION INTRAMUSCULAR; INTRAVENOUS at 14:06

## 2022-07-27 RX ADMIN — ROCURONIUM BROMIDE 50 MG: 10 INJECTION INTRAVENOUS at 13:14

## 2022-07-27 RX ADMIN — CEFAZOLIN 2000 MG: 2 INJECTION, POWDER, FOR SOLUTION INTRAMUSCULAR; INTRAVENOUS at 12:59

## 2022-07-27 RX ADMIN — DEXMEDETOMIDINE 10 MCG: 100 INJECTION, SOLUTION, CONCENTRATE INTRAVENOUS at 13:22

## 2022-07-27 RX ADMIN — DEXAMETHASONE SODIUM PHOSPHATE 8 MG: 4 INJECTION, SOLUTION INTRAMUSCULAR; INTRAVENOUS at 13:26

## 2022-07-27 RX ADMIN — PROPOFOL 200 MG: 10 INJECTION, EMULSION INTRAVENOUS at 13:13

## 2022-07-27 RX ADMIN — FENTANYL CITRATE 50 MCG: 50 INJECTION, SOLUTION INTRAMUSCULAR; INTRAVENOUS at 13:30

## 2022-07-27 RX ADMIN — LIDOCAINE HYDROCHLORIDE 100 MG: 20 INJECTION, SOLUTION EPIDURAL; INFILTRATION; INTRACAUDAL; PERINEURAL at 13:13

## 2022-07-27 RX ADMIN — FLUMAZENIL 0.5 MG: 0.1 INJECTION, SOLUTION INTRAVENOUS at 13:50

## 2022-07-27 RX ADMIN — SUGAMMADEX 200 MG: 100 INJECTION, SOLUTION INTRAVENOUS at 13:35

## 2022-07-27 RX ADMIN — SODIUM CHLORIDE, POTASSIUM CHLORIDE, SODIUM LACTATE AND CALCIUM CHLORIDE: 600; 310; 30; 20 INJECTION, SOLUTION INTRAVENOUS at 12:33

## 2022-07-27 RX ADMIN — SODIUM CHLORIDE, POTASSIUM CHLORIDE, SODIUM LACTATE AND CALCIUM CHLORIDE: 600; 310; 30; 20 INJECTION, SOLUTION INTRAVENOUS at 13:02

## 2022-07-27 ASSESSMENT — PAIN DESCRIPTION - LOCATION
LOCATION: ABDOMEN
LOCATION: ABDOMEN;BACK
LOCATION: ABDOMEN

## 2022-07-27 ASSESSMENT — PAIN DESCRIPTION - ORIENTATION
ORIENTATION: LEFT
ORIENTATION: LEFT;LOWER
ORIENTATION: LEFT

## 2022-07-27 ASSESSMENT — PAIN SCALES - GENERAL
PAINLEVEL_OUTOF10: 9
PAINLEVEL_OUTOF10: 8
PAINLEVEL_OUTOF10: 7
PAINLEVEL_OUTOF10: 5
PAINLEVEL_OUTOF10: 6

## 2022-07-27 ASSESSMENT — PAIN DESCRIPTION - DESCRIPTORS
DESCRIPTORS: PRESSURE
DESCRIPTORS: PRESSURE
DESCRIPTORS: CRAMPING
DESCRIPTORS: PRESSURE
DESCRIPTORS: ACHING;DISCOMFORT;SHARP;SHOOTING

## 2022-07-27 ASSESSMENT — PAIN DESCRIPTION - PAIN TYPE
TYPE: SURGICAL PAIN
TYPE: ACUTE PAIN
TYPE: SURGICAL PAIN
TYPE: SURGICAL PAIN

## 2022-07-27 ASSESSMENT — PAIN DESCRIPTION - FREQUENCY
FREQUENCY: CONTINUOUS

## 2022-07-27 NOTE — DISCHARGE INSTRUCTIONS
No heavy activity for 3 days  Continue hydration and avoid constipation  No driving for 24 hours or on narcotics  No work for 24 hours or on narcotics  Motrin/tylenol/azo OTC PRN mild pain  Call or return to hospital with questions or concerns  Follow-up in one month with renal ultrasound. Call for an appointment          [facility]  [phone]    Do not drive, work around [facility] or use equipment. Do not drink any alcoholic beverages. Do not smoke while alone. Avoid making important decisions. Plan to spend a quiet, relaxed evening @ home. Resume normal activities as you begin to feel better. Eat lightly for your first meal, then gradually increase your diet to what is normal for you. In case of nausea, avoid food and drink only clear liquids. Resume food as nausea ceases. Notify your surgeon if you experience fever, chills, large amount of bleeding, difficulty breathing, persistent nausea and vomiting or any other disturbing problem. Call for a follow-up appointment with your surgeon.

## 2022-07-27 NOTE — BRIEF OP NOTE
Brief Postoperative Note      Patient: Chapis Trinidad  YOB: 1980  MRN: [id number]    Date of Procedure: 7/27/2022    Pre-Op Diagnosis: Distal left ureter stones x 2    Post-Op Diagnosis: Same       Procedure   Left ureteroscopy, holmium laser lithotripsy, and basket stone extraction of left ureter stones  Cystoscopy and left ureter stent removal    Surgeon(s):  Ale Coppola MD    Anesthesia: General    Estimated Blood Loss (mL): Minimal    Complications: None    Specimens:   ID Type Source Tests Collected by Time Destination   A : LEFT URETER  STONE Tissue Tissue SURGICAL PATHOLOGY, [address] [place] [address] 2, MD 7/27/2022 1331      Drains: none      Findings:    3 stones in distal left ureter- largest 8mm   No other stones found in ureter  Evidence of left UPJ repair - appears patent with blown out appearance of visible calyx  No concerning masses or lesions in bladder    Electronically signed by Ale Coppola MD on 7/27/2022 at 1:35 PM

## 2022-07-27 NOTE — OP NOTE
[address], [address] [facility]                                OPERATIVE REPORT    PATIENT NAME: Sindi Barker                  :        1980  MED REC NO:   [id number]                          ROOM:  ACCOUNT NO:   [de-identified]                           ADMIT DATE: 2022  PROVIDER:     Monika Jc MD    DATE OF PROCEDURE:  2022    PREOPERATIVE DIAGNOSIS:  Distal left ureter stones x 3. POSTOPERATIVE DIAGNOSIS:  Distal left ureter stones x 3. PROCEDURE PERFORMED:  1. Left ureteroscopy with holmium laser lithotripsy and basket  extraction of distal left ureter stones. 2.  Cystoscopy and left ureter stent removal.    SURGEON:  Monika Jc MD    ANESTHESIA:  General.    ESTIMATED BLOOD LOSS:  Minimal.    COMPLICATIONS  None. SPECIMEN:  Left ureter stones. DRAINS PLACED:  None. FINDINGS  1. Three stones in distal left ureter, largest size 8 mm. 2.  No other stones found in ureter. 3.  Evidence of prior left UPJ repair and appears patent with blown out  appearance of visible dre. 4.  No concerning masses or lesions found in the bladder. DISPOSITION:  Stable to PACU. INDICATIONS FOR PROCEDURE:  The patient is a 49-year-old female with a  history of kidney stones who was diagnosed with three distal left ureter  stones who had a stent placed several weeks ago. She also has a history  of a left pyeloplasty in the distant past.  She does have stents in  place. Her preoperative urine culture was negative. I reviewed the  risks, benefits and alternative therapies. The patient elected to  proceed with surgery. We discussed the risk of infection, bleeding and  ureter injury. OPERATIVE REPORT:  The patient received preoperative antibiotics. She  had compression boots in place. She was brought to the operating room,  placed on the operating room table.   A general anesthetic was  administered by the Anesthesia Service. She was then placed in dorsal  lithotomy position and prepped and draped in sterile fashion after being  appropriately padded. Time-out was performed per protocol. A 21-Pakistani cystoscope with a 30 degrees lens was placed through the  urethra and into the bladder without difficulty. The bladder was  irrigated several times. Using flexible graspers, the stent was removed  to the meatus and a sensor guidewire was placed into the left collecting  system as a safety wire. A rigid ureteroscope was then placed in the  distal left ureter where the first of the two stones were seen. Using a  basket device, both of these stones were removed. I then advanced the  scope to the third stone which was approximately 8 mm. Using the  holmium laser and a 242 micron fiber, the stone was fragmented into  multiple pieces. A basket extraction device was used to remove the  stone fragments. The stones were sent to pathology for stone analysis. I then advanced the scope into the proximal left ureter and to the left  UPJ. No other stone was found in the left ureter. There was evidence  of a prior left pyeloplasty and the left proximal ureter appeared  patent. Of the calyces I could see they did have more blown out  appearance. There is minimal blood loss during this procedure. The  rigid ureteroscope was slowly removed and left ureter appeared intact. The safety wire was removed. The bladder was emptied with the  cystoscope. The patient tolerated the procedure well. Her stones were  sent for stone analysis. She was brought to the PACU in stable  condition. The patient will be discharged with a prescription for Norco and Zofran. She will follow up in our office in 3-4 weeks with a renal ultrasound.         Evangelist Springer MD    D: 07/27/2022 13:46:42       T: 07/27/2022 13:49:18     [id number]  Job#: [id number]     Doc#: [id number]    CC:

## 2022-07-27 NOTE — PROGRESS NOTES
1353- pt received from OR. Monitors placed and alarms on. Report received from [facility]. 1355- blood glucose 121  1400- pt resting comfortably with eyes closed. Arouses easily to name being called. 1406- pt medicated for complaints of pain. 1412- pt medicated for complaints of pain. 1416- pt tolerating ice chips well. 1425- pt transported to [facility] by RN and bedside report given to [place], RN.

## 2022-07-27 NOTE — H&P
H&P dated 7/19/2022 reviewed  No changes    Hx of 3 distal left ureter stones with stent in place  C/o mild stent irritation    Urine culture 7/19/2022 negative     Prior left pyeloplasty 1990's    A, ox3, nad  Soft, nd/nt, benign  CTA B  RRR    CT Scan a/p 7/2022 reviewed  Distal left ureter stones  No renal stones    Discussed r, b, at's  Discussed risks including infection, bleeding, need for more surgery, ureter injury, need for stent replacement, failure of therapy  Discussed ureteroscopy, holmium laser lithotripsy, stone manipulation and possible stent replacement    Plan  Ancef on call to OR  Proceed with above surgery

## 2022-07-27 NOTE — ANESTHESIA PRE PROCEDURE
Department of Anesthesiology  Preprocedure Note       Name:  Fannie Reyez   Age:  43 y.o.  :  1980                                          MRN:  [id number]         Date:  2022      Surgeon: Genia Christine):  Ya Jacinto MD    Procedure: Procedure(s):  LEFT CYSTOSCOPY URETEROSCOPY RETROGRADE PYELOGRAM STONE MANIPULATION WITH HOLIUM LASER LITHOTRIPSY POSS STENT PLACEMENT    Medications prior to admission:   Prior to Admission medications    Medication Sig Start Date End Date Taking? Authorizing Provider   HYDROcodone-acetaminophen (NORCO) 5-325 MG per tablet Take 1 tablet by mouth every 6 hours as needed for Pain. Historical Provider, MD   ondansetron (ZOFRAN ODT) 4 MG disintegrating tablet Take 1 tablet by mouth every 8 hours as needed for Nausea or Vomiting 3/8/22   Vinayak Mixon PA-C   butalbital-APAP-caffeine (FIORICET) -40 MG CAPS per capsule Take 1 capsule by mouth every 4 hours as needed for Headaches 20   Amanda Nguyễn DO       Current medications:    No current facility-administered medications for this visit. No current outpatient medications on file. Facility-Administered Medications Ordered in Other Visits   Medication Dose Route Frequency Provider Last Rate Last Admin    lactated ringers infusion   IntraVENous Continuous Miguel Rajan  mL/hr at 22 1233 New Bag at 22 1233       Allergies: Allergies   Allergen Reactions    Flexeril [Cyclobenzaprine] Swelling    Imitrex [Sumatriptan] Other (See Comments)     Does something to her nerves.     Lyrica [Pregabalin] Other (See Comments)     Shuts her kidneys down       Problem List:    Patient Active Problem List   Diagnosis Code    Flank pain R10.9       Past Medical History:        Diagnosis Date    Arthritis     Chronic kidney disease     Diabetes mellitus ([facility] [address]     runs in family sugar pts bottoms out sometimes    Hypertension     Kidney stone        Past Surgical History: Procedure Laterality Date    CYSTOSCOPY Left 7/3/2022    CYSTOSCOPY RETROGRADE PYLEOGRAM WITH STENT INSERTION performed by Abril Badillo MD at [address] [place] [address] History:    Social History     Tobacco Use    Smoking status: Former    Smokeless tobacco: Never   Substance Use Topics    Alcohol use: Yes     Comment: 7 shot a month                                Counseling given: Not Answered      Vital Signs (Current): There were no vitals filed for this visit.                                            BP Readings from Last 3 Encounters:   07/27/22 130/86   07/07/22 (!) 135/110   03/08/22 (!) 121/91       NPO Status:                                                                                 BMI:   Wt Readings from Last 3 Encounters:   07/05/22 170 lb 4.8 oz (77.2 kg)   03/08/22 162 lb (73.5 kg)   07/20/20 139 lb (63 kg)     There is no height or weight on file to calculate BMI.    CBC:   Lab Results   Component Value Date/Time    WBC 9.6 07/07/2022 02:17 AM    RBC 3.54 07/07/2022 02:17 AM    HGB 9.6 07/07/2022 02:17 AM    HCT 32.7 07/07/2022 02:17 AM    MCV 92.4 07/07/2022 02:17 AM    RDW 14.4 07/07/2022 02:17 AM     07/07/2022 02:17 AM       CMP:   Lab Results   Component Value Date/Time     07/07/2022 02:17 AM    K 3.2 07/07/2022 02:17 AM    CL 98 07/07/2022 02:17 AM    CO2 26 07/07/2022 02:17 AM    BUN 9 07/07/2022 02:17 AM    CREATININE 0.8 07/07/2022 02:17 AM    GFRAA >60 07/07/2022 02:17 AM    LABGLOM >60 07/07/2022 02:17 AM    GLUCOSE 154 07/07/2022 02:17 AM    PROT 6.9 07/02/2022 07:51 PM    CALCIUM 8.9 07/07/2022 02:17 AM    BILITOT 0.4 07/02/2022 07:51 PM    ALKPHOS 72 07/02/2022 07:51 PM    AST 20 07/02/2022 07:51 PM    ALT 13 07/02/2022 07:51 PM       POC Tests:   Recent Labs     07/27/22  1227   POCGLU 93       Coags: No results found for: PROTIME, INR, APTT    HCG (If Applicable):   Lab Results   Component Value Date    PREGTESTUR NEGATIVE 07/27/2022        ABGs: No results

## 2022-07-27 NOTE — PROGRESS NOTES
1428 Pt. Brought back to unit, [facility] Pt. Is A&O, VSS, pt. Provided with crackers and beverage of choice. 2777 Boris Moya instructions reviewed with pt and boyfriend, all parties express understanding. 1528 Pt. To [place] home in private vehicle.

## 2022-07-31 NOTE — ANESTHESIA POSTPROCEDURE EVALUATION
Department of Anesthesiology  Postprocedure Note    Patient: Ade Walden  MRN: [id number]  YOB: 1980  Date of evaluation: 7/31/2022      Procedure Summary     Date: 07/27/22 Room / Location: [address]    Anesthesia Start: 1302 Anesthesia Stop: 0828    Procedure: LEFT CYSTOSCOPY URETEROSCOPY RETROGRADE PYELOGRAM STONE [place] LITHOTRIPSY POSS STENT PLACEMENT (Left: Ureter) Diagnosis:       Ureteral stone      (Ureteral stone [N20.1])    Surgeons: Amanda Chavez MD Responsible Provider: Evelina Conrad MD    Anesthesia Type: general ASA Status: 2          Anesthesia Type: No value filed.     Regis Phase I: Regis Score: 10    Regis Phase II: Regis Score: 10      Anesthesia Post Evaluation    Patient location during evaluation: PACU  Patient participation: complete - patient participated  Level of consciousness: sleepy but conscious  Pain score: 1  Airway patency: patent  Nausea & Vomiting: no nausea and no vomiting  Complications: no  Cardiovascular status: hemodynamically stable  Respiratory status: acceptable  Hydration status: euvolemic

## 2022-08-02 LAB
STONE COMPOSITION: NORMAL
STONE DESCRIPTION: NORMAL
STONE MASS: 157 MG

## 2023-07-20 NOTE — PROGRESS NOTES
[facility] HOSPITALIST PROGRESS NOTE      PCP: Mt Hunter MD    Date of Admission: 7/2/2022    Subjective: Pain worse today  Fever slight spike overnight    Brief Hospital summary     The patient is a 14-year-old female with history of chronic hepatitis C who was admitted with left-sided flank pain. CT abdomen showed distal left ureteral calculi, urology consulted, IV fluids  Ceftriaxone started    Vitals signs: Afebrile, heart rate 60s to 70s range, blood pressure 143/92, on room air  Medications: Ceftriaxone, heparin, normal saline 800 mm/h    Antibiotics: Ceftriaxone    Fluid status: 1675 cc    Labs: Sodium 138, potassium 3.4, creatinine 1  Glucose 11.9, hemoglobin 9, platelet 248    Imaging:   CT renal stone protocol showed acute left obstructive uropathy secondary to 3 distal left ureteral calculi each measuring 5.6 mm, one calculus is post at left UV junction    Assessment/Plan:     Obstructive uropathy secondary to ureterolithiasis  Acute kidney injury:   Admitted with flank pain, leukocytosis, sinus tachycardia  CT renal showed obstructive uropathy  Flomax added, IV fluids  Creatinine down to 1.2 status post cystoscopy with stent placement  Continue ceftriaxone, awaiting cultures leukocytosis improving, WBC count 18.3  Await cultures  Continue to monitor  WBC down to 11.9, hemoglobin 9.1, creatinine 1  Replace potassium        History of hepatitis C: Chronic stable    DVT prophlaxis:   Heparin      Physical Exam Performed:       BP (!) 143/92   Pulse 68   Temp 98.7 °F (37.1 °C) (Oral)   Resp 16   Ht 5' 3\" (1.6 m)   Wt 170 lb 4.8 oz (77.2 kg)   LMP 06/20/2022   SpO2 99%   BMI 30.17 kg/m²     Physical Exam  Constitutional:       General: She is not in acute distress. Appearance: Normal appearance. HENT:      Head: Normocephalic and atraumatic.       Right Ear: External ear normal.      Left Ear: External ear normal.   Eyes:      Extraocular Movements: Extraocular movements intact. Pupils: Pupils are equal, round, and reactive to light. Cardiovascular:      Rate and Rhythm: Normal rate and regular rhythm. Heart sounds: No murmur heard. Pulmonary:      Effort: Pulmonary effort is normal. No respiratory distress. Breath sounds: Normal breath sounds. No wheezing. Abdominal:      General: Bowel sounds are normal. There is no distension. Palpations: Abdomen is soft. Tenderness: There is no abdominal tenderness. Musculoskeletal:         General: No swelling. Cervical back: Normal range of motion. Skin:     General: Skin is warm. Neurological:      General: No focal deficit present. Mental Status: She is alert and oriented to person, place, and time. Cranial Nerves: No cranial nerve deficit. Psychiatric:         Mood and Affect: Mood normal.       Labs:   Recent Labs     07/02/22 1951 07/04/22 0146 07/05/22 0434   WBC 27.2* 18.3* 11.9*   HGB 11.6* 9.9* 9.1*   HCT 36.5* 31.5* 28.9*    143 120*     Recent Labs     07/02/22 1951 07/04/22 0146 07/05/22  0434   * 136 138   K 3.3* 3.6 3.4*   CL 98* 105 107   CO2 19* 22 19*   BUN 16 18 12   CREATININE 1.5* 1.2* 1.0   CALCIUM 8.9 8.1* 7.8*     Recent Labs     07/02/22 1951   AST 20   ALT 13   BILIDIR 0.2   BILITOT 0.4   ALKPHOS 72     No results for input(s): INR in the last 72 hours. No results for input(s): Roxanne Mould in the last 72 hours. Urinalysis:      Lab Results   Component Value Date/Time    NITRU NEGATIVE 07/02/2022 11:05 PM    WBCUA 3 07/02/2022 11:05 PM    BACTERIA RARE 07/02/2022 11:05 PM    RBCUA 2 07/02/2022 11:05 PM    BLOODU SMALL 07/02/2022 11:05 PM    SPECGRAV 1.015 07/02/2022 11:05 PM       Radiology:  XR ABDOMEN (KUB) (SINGLE AP VIEW)   Preliminary Result   Abnormal appearance of the proximal portion of a left ureteral stent which   appears looped within the proximal left ureter or dilated left renal pelvis.          FL LESS THAN 1 HOUR   Final Result      CT ABDOMEN PELVIS WO CONTRAST Additional Contrast? None   Final Result   Acute left obstructive uropathy secondary to 3 distal left ureteral calculi   each measuring 5-6 mm.  1 calculus is perched at the left UVJ. The Loera catheter is malpositioned with the tip coiled in the vagina. Mild bibasilar airspace disease, likely atelectasis. Pneumonia not excluded. Findings were discussed with Katherine Rubinstein at 10:49 pm on 7/2/2022. XR CHEST PORTABLE   Final Result   Minimal left basilar atelectasis. Otherwise, clear lungs.                     Ernie Luevano MD  7/5/2022 1:43 PM ADMIT

## 2023-11-02 ENCOUNTER — HOSPITAL ENCOUNTER (EMERGENCY)
Age: 43
Discharge: HOME | End: 2023-11-02
Attending: STUDENT IN AN ORGANIZED HEALTH CARE EDUCATION/TRAINING PROGRAM
Payer: MEDICAID

## 2023-11-02 ENCOUNTER — APPOINTMENT (OUTPATIENT)
Dept: GENERAL RADIOLOGY | Age: 43
End: 2023-11-02
Payer: MEDICAID

## 2023-11-02 VITALS
HEART RATE: 52 BPM | TEMPERATURE: 98.2 F | RESPIRATION RATE: 20 BRPM | SYSTOLIC BLOOD PRESSURE: 152 MMHG | BODY MASS INDEX: 26.58 KG/M2 | WEIGHT: 150 LBS | OXYGEN SATURATION: 100 % | HEIGHT: 63 IN | DIASTOLIC BLOOD PRESSURE: 102 MMHG

## 2023-11-02 DIAGNOSIS — M54.31 BILATERAL SCIATICA: ICD-10-CM

## 2023-11-02 DIAGNOSIS — M54.32 BILATERAL SCIATICA: ICD-10-CM

## 2023-11-02 DIAGNOSIS — G89.29 ACUTE EXACERBATION OF CHRONIC LOW BACK PAIN: ICD-10-CM

## 2023-11-02 DIAGNOSIS — J06.9 VIRAL URI: Primary | ICD-10-CM

## 2023-11-02 DIAGNOSIS — M54.50 ACUTE EXACERBATION OF CHRONIC LOW BACK PAIN: ICD-10-CM

## 2023-11-02 LAB
INFLUENZA A ANTIGEN: NOT DETECTED
INFLUENZA B ANTIGEN: NOT DETECTED
SARS-COV-2 RDRP RESP QL NAA+PROBE: NOT DETECTED
SOURCE: NORMAL

## 2023-11-02 PROCEDURE — 87635 SARS-COV-2 COVID-19 AMP PRB: CPT

## 2023-11-02 PROCEDURE — 87502 INFLUENZA DNA AMP PROBE: CPT

## 2023-11-02 PROCEDURE — 71046 X-RAY EXAM CHEST 2 VIEWS: CPT

## 2023-11-02 PROCEDURE — 6360000002 HC RX W HCPCS: Performed by: STUDENT IN AN ORGANIZED HEALTH CARE EDUCATION/TRAINING PROGRAM

## 2023-11-02 PROCEDURE — 96372 THER/PROPH/DIAG INJ SC/IM: CPT

## 2023-11-02 PROCEDURE — 6370000000 HC RX 637 (ALT 250 FOR IP): Performed by: STUDENT IN AN ORGANIZED HEALTH CARE EDUCATION/TRAINING PROGRAM

## 2023-11-02 PROCEDURE — 99284 EMERGENCY DEPT VISIT MOD MDM: CPT

## 2023-11-02 RX ORDER — KETOROLAC TROMETHAMINE 15 MG/ML
30 INJECTION, SOLUTION INTRAMUSCULAR; INTRAVENOUS ONCE
Status: COMPLETED | OUTPATIENT
Start: 2023-11-02 | End: 2023-11-02

## 2023-11-02 RX ORDER — GABAPENTIN 300 MG/1
300 CAPSULE ORAL ONCE
Status: COMPLETED | OUTPATIENT
Start: 2023-11-02 | End: 2023-11-02

## 2023-11-02 RX ORDER — IBUPROFEN 600 MG/1
600 TABLET ORAL EVERY 6 HOURS PRN
Qty: 20 TABLET | Refills: 0 | Status: SHIPPED | OUTPATIENT
Start: 2023-11-02 | End: 2023-11-07

## 2023-11-02 RX ORDER — GABAPENTIN 300 MG/1
300 CAPSULE ORAL 3 TIMES DAILY
Qty: 15 CAPSULE | Refills: 0 | Status: SHIPPED | OUTPATIENT
Start: 2023-11-02 | End: 2023-11-07

## 2023-11-02 RX ORDER — BENZONATATE 100 MG/1
100-200 CAPSULE ORAL 3 TIMES DAILY PRN
Qty: 18 CAPSULE | Refills: 0 | Status: SHIPPED | OUTPATIENT
Start: 2023-11-02 | End: 2023-11-05

## 2023-11-02 RX ORDER — GABAPENTIN 300 MG/1
300 CAPSULE ORAL 3 TIMES DAILY
Qty: 15 CAPSULE | Refills: 0 | Status: SHIPPED | OUTPATIENT
Start: 2023-11-02 | End: 2023-11-02 | Stop reason: SDUPTHER

## 2023-11-02 RX ADMIN — GABAPENTIN 300 MG: 300 CAPSULE ORAL at 16:22

## 2023-11-02 RX ADMIN — KETOROLAC TROMETHAMINE 30 MG: 15 INJECTION, SOLUTION INTRAMUSCULAR; INTRAVENOUS at 16:22

## 2023-11-02 ASSESSMENT — ENCOUNTER SYMPTOMS
ABDOMINAL PAIN: 0
SHORTNESS OF BREATH: 0
VOMITING: 0
SORE THROAT: 0
NAUSEA: 0
BACK PAIN: 1
COUGH: 0

## 2023-11-02 ASSESSMENT — PAIN DESCRIPTION - ORIENTATION: ORIENTATION: LOWER

## 2023-11-02 ASSESSMENT — PAIN DESCRIPTION - LOCATION: LOCATION: BACK

## 2023-11-02 ASSESSMENT — PAIN SCALES - GENERAL: PAINLEVEL_OUTOF10: 9

## 2023-11-02 ASSESSMENT — PAIN DESCRIPTION - DESCRIPTORS: DESCRIPTORS: SHARP;STABBING

## 2023-11-02 NOTE — ED NOTES
Pt to ED c/o cough, back pain and ear fullness X 5 days. Denies any fevers. States the cough is worse at night. Pt states she has been taking OTC musinex with no imp.        José Luis Holliday RN  11/02/23 2810

## 2023-11-02 NOTE — ED PROVIDER NOTES
are interpreted by the Emergency Department Physician who either signs or Co-signs this chart in the absence of a cardiologist.      [address]:        Medical Decision Making  Patient arrives here for viral URI symptoms as well as acute exacerbation of chronic back pain. On examination she appears ill but nontoxic she is mentating appropriately, has 5 out of 5 strength in upper and lower extremities there are some mild paresthesias on her around her ankle on both the medial and lateral side with no sensory deficits proximally or distally. She does have a positive bilateral straight leg test.  Midline has no step-offs tenderness or deformities. There is no overlying rash. Symptoms not consistent with cauda equina or conus medullaris syndrome. I did consider epidural abscess however she has no IV drug use history, no fevers and her symptoms have been chronic and ongoing. I did discuss with her that if she develops fevers worsening pain and neuro symptoms to immediate return to the emergency department for further evaluation. Suspect her worsening of back pain is part of a viral syndrome. Her COVID and flu testing is negative. Her chest x-ray is unremarkable. I discussed with her medications to treat her back pain and restless leg syndrome as she has previously been on gabapentin we decided to use a course of gabapentin here. Patient is not driving home I encouraged her not to drive on this medication. We will also prescribe NSAIDs and Tessalon Perles for further symptom relief. Recommend follow-up to PCP within a few days for reevaluation. Risk  Prescription drug management.               CONSULTS:  None    CRITICAL CARE:  Total critical care time today provided was at least 0 minutes hat required close evaluation and/or intervention with concern for patient decompensation, discussion with patient and family about goals of care in the setting of a critical scenario, discussion

## 2023-11-02 NOTE — ED TRIAGE NOTES
Patient says that she has been coughing, wheezing at night, and having lower back pain for 4 or 5  days. Says this happened a few weeks ago, she did not get seen then, and is now concerned because she feels worse. Says she feels like she has phlegm but nothing will come up when she coughs.

## 2023-11-12 ENCOUNTER — APPOINTMENT (OUTPATIENT)
Dept: CT IMAGING | Age: 43
End: 2023-11-12
Payer: MEDICAID

## 2023-11-12 ENCOUNTER — HOSPITAL ENCOUNTER (EMERGENCY)
Age: 43
Discharge: ANOTHER HOSPITAL | End: 2023-11-12
Payer: MEDICAID

## 2023-11-12 VITALS
WEIGHT: 156 LBS | OXYGEN SATURATION: 98 % | DIASTOLIC BLOOD PRESSURE: 86 MMHG | RESPIRATION RATE: 16 BRPM | HEART RATE: 67 BPM | BODY MASS INDEX: 27.64 KG/M2 | TEMPERATURE: 98.1 F | SYSTOLIC BLOOD PRESSURE: 165 MMHG | HEIGHT: 63 IN

## 2023-11-12 DIAGNOSIS — S32.009A CLOSED FRACTURE OF TRANSVERSE PROCESS OF LUMBAR VERTEBRA, INITIAL ENCOUNTER (HCC): ICD-10-CM
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
## 2023-11-12 DIAGNOSIS — S01.01XA LACERATION OF SCALP, INITIAL ENCOUNTER: ICD-10-CM

## 2023-11-12 DIAGNOSIS — S30.0XXA TRAUMATIC HEMATOMA OF BUTTOCK, INITIAL ENCOUNTER: ICD-10-CM

## 2023-11-12 DIAGNOSIS — S09.90XA INJURY OF HEAD, INITIAL ENCOUNTER: ICD-10-CM

## 2023-11-12 LAB
ALBUMIN SERPL-MCNC: 4.1 GM/DL (ref 3.4–5)
ALP BLD-CCNC: 66 IU/L (ref 40–129)
ALT SERPL-CCNC: 9 U/L (ref 10–40)
ANION GAP SERPL CALCULATED.3IONS-SCNC: 13 MMOL/L (ref 4–16)
AST SERPL-CCNC: 20 IU/L (ref 15–37)
BASOPHILS ABSOLUTE: 0.1 K/CU MM
BASOPHILS RELATIVE PERCENT: 0.6 % (ref 0–1)
BILIRUB SERPL-MCNC: 0.2 MG/DL (ref 0–1)
BUN SERPL-MCNC: 14 MG/DL (ref 6–23)
CALCIUM SERPL-MCNC: 8.8 MG/DL (ref 8.3–10.6)
CHLORIDE BLD-SCNC: 107 MMOL/L (ref 99–110)
CO2: 20 MMOL/L (ref 21–32)
CREAT SERPL-MCNC: 0.7 MG/DL (ref 0.6–1.1)
DIFFERENTIAL TYPE: ABNORMAL
EOSINOPHILS ABSOLUTE: 0.2 K/CU MM
EOSINOPHILS RELATIVE PERCENT: 1.3 % (ref 0–3)
GFR SERPL CREATININE-BSD FRML MDRD: >60 ML/MIN/1.73M2
GLUCOSE SERPL-MCNC: 89 MG/DL (ref 70–99)
HCG QUALITATIVE: NEGATIVE
HCT VFR BLD CALC: 32.7 % (ref 37–47)
HEMOGLOBIN: 9.8 GM/DL (ref 12.5–16)
IMMATURE NEUTROPHIL %: 0.6 % (ref 0–0.43)
LYMPHOCYTES ABSOLUTE: 1.7 K/CU MM
LYMPHOCYTES RELATIVE PERCENT: 9.6 % (ref 24–44)
MCH RBC QN AUTO: 25.1 PG (ref 27–31)
MCHC RBC AUTO-ENTMCNC: 30 % (ref 32–36)
MCV RBC AUTO: 83.8 FL (ref 78–100)
MONOCYTES ABSOLUTE: 1.4 K/CU MM
MONOCYTES RELATIVE PERCENT: 7.7 % (ref 0–4)
NUCLEATED RBC %: 0 %
PDW BLD-RTO: 15.6 % (ref 11.7–14.9)
PLATELET # BLD: 267 K/CU MM (ref 140–440)
PMV BLD AUTO: 12.3 FL (ref 7.5–11.1)
POTASSIUM SERPL-SCNC: 3.6 MMOL/L (ref 3.5–5.1)
RBC # BLD: 3.9 M/CU MM (ref 4.2–5.4)
SEGMENTED NEUTROPHILS ABSOLUTE COUNT: 14 K/CU MM
SEGMENTED NEUTROPHILS RELATIVE PERCENT: 80.2 % (ref 36–66)
SODIUM BLD-SCNC: 140 MMOL/L (ref 135–145)
TOTAL IMMATURE NEUTOROPHIL: 0.11 K/CU MM
TOTAL NUCLEATED RBC: 0 K/CU MM
TOTAL PROTEIN: 7.4 GM/DL (ref 6.4–8.2)
TOTAL RETICULOCYTE COUNT: 0.06 K/CU MM
WBC # BLD: 17.5 K/CU MM (ref 4–10.5)

## 2023-11-12 PROCEDURE — 96374 THER/PROPH/DIAG INJ IV PUSH: CPT

## 2023-11-12 PROCEDURE — 85025 COMPLETE CBC W/AUTO DIFF WBC: CPT

## 2023-11-12 PROCEDURE — 96376 TX/PRO/DX INJ SAME DRUG ADON: CPT

## 2023-11-12 PROCEDURE — 76376 3D RENDER W/INTRP POSTPROCES: CPT

## 2023-11-12 PROCEDURE — 80053 COMPREHEN METABOLIC PANEL: CPT

## 2023-11-12 PROCEDURE — 84703 CHORIONIC GONADOTROPIN ASSAY: CPT

## 2023-11-12 PROCEDURE — 6360000004 HC RX CONTRAST MEDICATION: Performed by: PHYSICIAN ASSISTANT

## 2023-11-12 PROCEDURE — 6360000002 HC RX W HCPCS: Performed by: PHYSICIAN ASSISTANT

## 2023-11-12 PROCEDURE — 96375 TX/PRO/DX INJ NEW DRUG ADDON: CPT

## 2023-11-12 PROCEDURE — 71260 CT THORAX DX C+: CPT

## 2023-11-12 PROCEDURE — 99285 EMERGENCY DEPT VISIT HI MDM: CPT

## 2023-11-12 PROCEDURE — 6370000000 HC RX 637 (ALT 250 FOR IP): Performed by: PHYSICIAN ASSISTANT

## 2023-11-12 PROCEDURE — 12002 RPR S/N/AX/GEN/TRNK2.6-7.5CM: CPT

## 2023-11-12 PROCEDURE — 72125 CT NECK SPINE W/O DYE: CPT

## 2023-11-12 PROCEDURE — 90471 IMMUNIZATION ADMIN: CPT | Performed by: PHYSICIAN ASSISTANT

## 2023-11-12 PROCEDURE — 90715 TDAP VACCINE 7 YRS/> IM: CPT | Performed by: PHYSICIAN ASSISTANT

## 2023-11-12 PROCEDURE — 70450 CT HEAD/BRAIN W/O DYE: CPT

## 2023-11-12 RX ORDER — ONDANSETRON 2 MG/ML
4 INJECTION INTRAMUSCULAR; INTRAVENOUS ONCE
Status: COMPLETED | OUTPATIENT
Start: 2023-11-12 | End: 2023-11-12

## 2023-11-12 RX ORDER — MORPHINE SULFATE 4 MG/ML
4 INJECTION, SOLUTION INTRAMUSCULAR; INTRAVENOUS
Status: DISCONTINUED | OUTPATIENT
Start: 2023-11-12 | End: 2023-11-13 | Stop reason: HOSPADM

## 2023-11-12 RX ADMIN — ONDANSETRON 4 MG: 2 INJECTION INTRAMUSCULAR; INTRAVENOUS at 18:54

## 2023-11-12 RX ADMIN — MORPHINE SULFATE 4 MG: 4 INJECTION, SOLUTION INTRAMUSCULAR; INTRAVENOUS at 18:55

## 2023-11-12 RX ADMIN — Medication 3 ML: at 21:22

## 2023-11-12 RX ADMIN — MORPHINE SULFATE 4 MG: 4 INJECTION, SOLUTION INTRAMUSCULAR; INTRAVENOUS at 21:16

## 2023-11-12 RX ADMIN — TETANUS TOXOID, REDUCED DIPHTHERIA TOXOID AND ACELLULAR PERTUSSIS VACCINE, ADSORBED 0.5 ML: 5; 2.5; 8; 8; 2.5 SUSPENSION INTRAMUSCULAR at 22:55

## 2023-11-12 RX ADMIN — IOPAMIDOL 75 ML: 755 INJECTION, SOLUTION INTRAVENOUS at 19:36

## 2023-11-12 ASSESSMENT — PAIN DESCRIPTION - DESCRIPTORS: DESCRIPTORS: SHARP;THROBBING

## 2023-11-12 ASSESSMENT — LIFESTYLE VARIABLES
HOW MANY STANDARD DRINKS CONTAINING ALCOHOL DO YOU HAVE ON A TYPICAL DAY: PATIENT DOES NOT DRINK
HOW OFTEN DO YOU HAVE A DRINK CONTAINING ALCOHOL: NEVER

## 2023-11-12 ASSESSMENT — PAIN DESCRIPTION - LOCATION
LOCATION: BACK
LOCATION: BACK;BUTTOCKS;HEAD

## 2023-11-12 ASSESSMENT — PAIN SCALES - GENERAL
PAINLEVEL_OUTOF10: 10
PAINLEVEL_OUTOF10: 10

## 2023-11-13 NOTE — ED NOTES
[facility] here to transport patient. Rich EMT given report. Patient transferred to [facility] at this time.       Camille Caballero RN  11/12/23 6170

## 2023-11-13 NOTE — ED NOTES
Wound cleansed and LET applied at this time. Bushra FARRELL notified.       Maite Flanagan RN  11/12/23 2123

## 2025-04-07 LAB
ALBUMIN: 4 G/DL
ALP BLD-CCNC: 55 U/L
ALT SERPL-CCNC: 13 U/L
ANION GAP SERPL CALCULATED.3IONS-SCNC: NORMAL MMOL/L
AST SERPL-CCNC: 14 U/L
BILIRUB SERPL-MCNC: 0.1 MG/DL (ref 0.1–1.4)
BUN BLDV-MCNC: 7 MG/DL
CALCIUM SERPL-MCNC: 8.6 MG/DL
CHLORIDE BLD-SCNC: 107 MMOL/L
CHOLESTEROL, TOTAL: 156 MG/DL
CHOLESTEROL/HDL RATIO: NORMAL
CO2: 24 MMOL/L
CREAT SERPL-MCNC: 1 MG/DL
ESTIMATED AVERAGE GLUCOSE: NORMAL
GFR, ESTIMATED: 71
GLUCOSE BLD-MCNC: 80 MG/DL
HBA1C MFR BLD: 4.7 %
HDLC SERPL-MCNC: 39 MG/DL (ref 35–70)
LDL CHOLESTEROL: 84
NONHDLC SERPL-MCNC: NORMAL MG/DL
POTASSIUM SERPL-SCNC: 4.1 MMOL/L
SODIUM BLD-SCNC: 138 MMOL/L
TOTAL PROTEIN: 6.5 G/DL (ref 6.4–8.2)
TRIGL SERPL-MCNC: 166 MG/DL
TSH SERPL DL<=0.05 MIU/L-ACNC: 2.71 UIU/ML
VITAMIN D 25-HYDROXY: 16
VITAMIN D2, 25 HYDROXY: NORMAL
VITAMIN D3,25 HYDROXY: NORMAL
VLDLC SERPL CALC-MCNC: 33 MG/DL

## 2025-04-24 ENCOUNTER — TELEPHONE (OUTPATIENT)
Dept: CARDIOLOGY CLINIC | Age: 45
End: 2025-04-24

## 2025-04-24 NOTE — TELEPHONE ENCOUNTER
Attempted to call patient, received a recording that the phone number has been changed or disconnected.     Patient can schedule a consult visit with the first available provider.     Referral by Dr. Waterman   DX: Intermittent chest pain.     First attempt at contacting patient.   Last Log in was on 7/25/2024 on My Chart.

## 2025-04-29 ENCOUNTER — TELEPHONE (OUTPATIENT)
Dept: CARDIOLOGY CLINIC | Age: 45
End: 2025-04-29

## 2025-04-29 NOTE — TELEPHONE ENCOUNTER
Unable to leave a VM for pt; phone number has been disconnected or no longer in service.     Patient can schedule a consult visit with the first available provider.     Referral by Dr. Tyra Waterman   Dx: Intermittent chest pain.     2nd attempt; 04/24 and 04/29.     Referral closed: unable to contact patient with phone number not in service.     Referral letter will be sent.

## 2025-05-12 ENCOUNTER — INITIAL CONSULT (OUTPATIENT)
Dept: CARDIOLOGY CLINIC | Age: 45
End: 2025-05-12
Payer: MEDICAID

## 2025-05-12 VITALS
DIASTOLIC BLOOD PRESSURE: 88 MMHG | SYSTOLIC BLOOD PRESSURE: 132 MMHG | WEIGHT: 152 LBS | BODY MASS INDEX: 26.93 KG/M2 | HEIGHT: 63 IN | HEART RATE: 65 BPM

## 2025-05-12 DIAGNOSIS — R06.02 SHORTNESS OF BREATH: ICD-10-CM

## 2025-05-12 DIAGNOSIS — M79.7 FIBROMYALGIA: ICD-10-CM

## 2025-05-12 DIAGNOSIS — R07.9 CHEST PAIN, UNSPECIFIED TYPE: ICD-10-CM

## 2025-05-12 DIAGNOSIS — R06.02 SOB (SHORTNESS OF BREATH) ON EXERTION: ICD-10-CM

## 2025-05-12 DIAGNOSIS — I10 PRIMARY HYPERTENSION: ICD-10-CM

## 2025-05-12 DIAGNOSIS — Z87.891 SMOKING HX: ICD-10-CM

## 2025-05-12 DIAGNOSIS — R07.2 PRECORDIAL PAIN: Primary | ICD-10-CM

## 2025-05-12 PROCEDURE — 99204 OFFICE O/P NEW MOD 45 MIN: CPT | Performed by: INTERNAL MEDICINE

## 2025-05-12 PROCEDURE — 3079F DIAST BP 80-89 MM HG: CPT | Performed by: INTERNAL MEDICINE

## 2025-05-12 PROCEDURE — 3075F SYST BP GE 130 - 139MM HG: CPT | Performed by: INTERNAL MEDICINE

## 2025-05-12 PROCEDURE — 93000 ELECTROCARDIOGRAM COMPLETE: CPT | Performed by: INTERNAL MEDICINE

## 2025-05-12 RX ORDER — ATENOLOL 50 MG/1
TABLET ORAL
COMMUNITY
Start: 2025-04-21

## 2025-05-12 RX ORDER — QUETIAPINE FUMARATE 25 MG/1
25 TABLET, FILM COATED ORAL NIGHTLY
COMMUNITY
Start: 2025-04-16

## 2025-05-12 RX ORDER — CHOLECALCIFEROL TAB 125 MCG (5000 UNIT) 125 MCG (5000 UT)
TAB
COMMUNITY
Start: 2025-04-10

## 2025-05-12 RX ORDER — ARIPIPRAZOLE 2 MG/1
2 TABLET ORAL 2 TIMES DAILY
COMMUNITY
Start: 2025-04-16

## 2025-05-12 RX ORDER — HYDROXYZINE HYDROCHLORIDE 25 MG/1
25 TABLET, FILM COATED ORAL 3 TIMES DAILY PRN
COMMUNITY
Start: 2025-04-16

## 2025-05-12 RX ORDER — OMEPRAZOLE 20 MG/1
CAPSULE, DELAYED RELEASE ORAL
COMMUNITY
Start: 2025-05-06

## 2025-05-12 RX ORDER — METHOCARBAMOL 500 MG/1
TABLET, FILM COATED ORAL
COMMUNITY
Start: 2025-05-06

## 2025-05-12 NOTE — PROGRESS NOTES
CLINICAL STAFF DOCUMENTATION    Dr. Basim Xiong  1980  [id number]    Have you had any Chest Pain recently? - Yes  When did the pain begin? - Years   What type of pain is it? - Sharp Pain   Tender to palpate (touch)? No  Does the pain radiate or stay in one place? - Yes  How long does the pain last? -  all day     How Severe is the pain? - 10  Is there anything that aggravates or triggers the pain?  Just comes on   Did you take any medication?  no    Is there anything that relieves it?  no  Do you have any other symptoms at the same time? SOB  Have you had any Shortness of Breath - Yes  When did it begin? - Months   If Yes - When at rest and on exertion  How many flights of stairs can you go up without having SOB? -  none  Are you on Oxygen during the day or at night? - No  How many pillows do you sleep with under your head? - 3  Have you had any dizziness - Yes pt has vertigo   Have you had any palpitations recently? - Yes  If Yes DO EKG - Do you feel your heart skipping  Do you have any edema - swelling in  left calf      When did you have your last labs drawn 4/25  What doctor ordered unknown  Do we have the labs in their chart Yes  Do you need any prescriptions refilled? - No  Do you have a surgery or procedure scheduled in the near future - No  Linn use tobacco products? - Yes  Do you drink alcohol? - No  Do you use any illicit drugs? - No  Caffeine? - Yes  How much caffeine? .4  cups daily.        Check medication list thoroughly!!! AND RECONCILE OUTSIDE MEDICATIONS  If dose has changed change the entire order not just the MG  BE SURE TO ASK PATIENT IF THEY NEED MEDICATION REFILLS  Verify Pharmacy and update if incorrect    Add to every patient's \"wrap up\" the following dot phrase AFTERVISITCARDIOHEARTHOUSE and ensure we explain this to our patients

## 2025-05-12 NOTE — PATIENT INSTRUCTIONS
Thank you for allowing us to care for you today!   We want to ensure we can follow your treatment plan and we strive to give you the best outcomes and experience possible.   If you ever have a life threatening emergency and call 911 - for an ambulance (EMS)  REMEMBER  Our providers can only care for you at:   [facility] or [facility]   Even if you have someone take you or you drive yourself we can only care for you in a [facility] facility. Our providers are not setup at the other healthcare locations!    PLEASE CALL OUR OFFICE DURING NORMAL BUSINESS HOURS  Monday through Friday 8 am to 5 pm  AFTER HOURS the physician on-call cannot help with scheduling, rescheduling, procedure instruction questions or any type of medication need or issue.  [facility] P:[phone] - [facility] P:[phone] - [facility] P:[phone]      If you receive a survey:  We would appreciate you taking the time to share your experience concerning your provider visit in the office.    These surveys are confidential!  We are eager to improve and are counting on you to share your feedback so we can ensure you get the best care possible.

## 2025-05-12 NOTE — PROGRESS NOTES
CARDIOLOGY  NOTE    Chief Complaint: Chest pain    HPI:   Lisa is a 45 y.o. year old who has Past medical history as noted below.   History of Present Illness  The patient presents for evaluation of chest pain.    She has been experiencing intermittent chest pain, described as severe enough to cause breathlessness. This symptom has been persisting daily for the past few months. The onset of the pain is unpredictable, occurring even during sleep and causing awakenings. Tenderness is reported in the left side of the chest, with the pain radiating towards the collarbone. The soreness in this area is constant. Shortness of breath is noted, particularly when ascending stairs. No diagnostic tests have been performed for these symptoms.     Additionally, she reports a persistent cough, which she attributes to her smoking habit. She vapes but notes a decrease in usage since transitioning to a third shift work schedule.    She is currently not on any medication for fibromyalgia but was previously treated for it.     She is on a beta blocker for migraines. She has indigestion, narcolepsy, fibromyalgia, and kidney disease. Leg swelling is reported, which is associated with significant pain.    SOCIAL HISTORY  Occupations: Works third shift  Tobacco: Vapes    FAMILY HISTORY  - Negative for heart problems in mother, father, siblings       Current Outpatient Medications   Medication Sig Dispense Refill    atenolol (TENORMIN) 50 MG tablet TAKE ONE TABLET BY MOUTH ONCE EVERY DAY      NATURAL VITAMIN D-3 125 MCG (5000 UT) TABS tablet Take 1 tablet by mouth once every day FOR EIGHT WEEKS      ARIPiprazole (ABILIFY) 2 MG tablet Take 1 tablet by mouth 2 times daily      hydrOXYzine HCl (ATARAX) 25 MG tablet Take 1 tablet by mouth 3 times daily as needed      methocarbamol (ROBAXIN) 500 MG tablet TAKE TWO TABLETS BY MOUTH FOUR TIMES DAILY AS NEEDED FOR muscle SPASMS      omeprazole (PRILOSEC)

## 2025-05-28 ENCOUNTER — OFFICE VISIT (OUTPATIENT)
Dept: ORTHOPEDIC SURGERY | Age: 45
End: 2025-05-28
Payer: COMMERCIAL

## 2025-05-28 VITALS
HEART RATE: 55 BPM | WEIGHT: 166.4 LBS | OXYGEN SATURATION: 97 % | RESPIRATION RATE: 16 BRPM | HEIGHT: 63 IN | BODY MASS INDEX: 29.48 KG/M2

## 2025-05-28 DIAGNOSIS — M22.2X1 PATELLOFEMORAL SYNDROME, BILATERAL: Primary | ICD-10-CM

## 2025-05-28 DIAGNOSIS — M17.0 OSTEOARTHRITIS OF BOTH KNEES, UNSPECIFIED OSTEOARTHRITIS TYPE: ICD-10-CM

## 2025-05-28 DIAGNOSIS — M22.2X2 PATELLOFEMORAL SYNDROME, BILATERAL: Primary | ICD-10-CM

## 2025-05-28 PROCEDURE — 99203 OFFICE O/P NEW LOW 30 MIN: CPT | Performed by: PHYSICIAN ASSISTANT

## 2025-05-28 RX ORDER — MELOXICAM 15 MG/1
15 TABLET ORAL DAILY PRN
Qty: 30 TABLET | Refills: 0 | Status: SHIPPED | OUTPATIENT
Start: 2025-05-28

## 2025-05-28 NOTE — PATIENT INSTRUCTIONS
Continue weight-bearing as tolerated.  Continue range of motion exercises as instructed.  Ice and elevate as needed.  Tylenol or Motrin for pain.  Follow up in 6 weeks if symptomatic  Voltaren gel has been sent to your pharmacy.   Bilateral knee braces were given today.    Out patient Physical Therapy has been ordered by your provider. [facility] will call you to set up therapy. If you have not heard from them within 24-48 hours of today's appointment, please reach out to them at [phone]. .      We are committed to providing you the best care possible.  If you receive a survey after visiting one of our offices, please take time to share your experience concerning your physician office visit.  These surveys are confidential and no health information about you is shared.  We are eager to improve for you and we are counting on your feedback to help make that happen.

## 2025-05-28 NOTE — PROGRESS NOTES
Patient seen in office today for B/L knee pain     Patient reports 8/10 pain.  RICE and medication are not effective to alleviate pain and reduce swelling.   Pain worsened by: Patient reports painful ROM & weight bearing.     Patient is interested in injection today    
needed for Pain     Dispense:  30 tablet     Refill:  0     Patient Instructions   Continue weight-bearing as tolerated.  Continue range of motion exercises as instructed.  Ice and elevate as needed.  Tylenol or Motrin for pain.  Follow up in 6 weeks if symptomatic  Voltaren gel has been sent to your pharmacy.   Bilateral knee braces were given today.    Out patient Physical Therapy has been ordered by your provider. [facility] will call you to set up therapy. If you have not heard from them within 24-48 hours of today's appointment, please reach out to them at [phone]. .      We are committed to providing you the best care possible.  If you receive a survey after visiting one of our offices, please take time to share your experience concerning your physician office visit.  These surveys are confidential and no health information about you is shared.  We are eager to improve for you and we are counting on your feedback to help make that happen.      BUD CULP  5/28/2025 11:45 AM

## 2025-06-05 ENCOUNTER — TELEPHONE (OUTPATIENT)
Dept: CARDIOLOGY CLINIC | Age: 45
End: 2025-06-05

## 2025-06-05 NOTE — PROGRESS NOTES
MRN: [id number]  Name: Lisa Xiong  : 1980    Insurance: Payor: [facility] /  /  /      Phone #: [phone]  Provider: SHEILA Garcia CNP     Date of Visit: 2025    Reason for visit: 1 month f/u w/results Recent Hospitalization Date:    Reason for Hospitalization:    Last EK25  Type of Device:        Vitals BP HR O2% WT HT ORTHO BP LYING ORTHO BP SITTING ORTHO BP SITTING   Today's Findings           Patients work up- Check List     Testing Last Date Completed Date Expected  ([place] [facility]) Additional Notes    MA to document For provider to complete Either MA or Provider    Carotid Duplex  STAT 1 WK 6 MTH       THIS WK 2 WK 1 YEAR     Cardiac CTA  STAT 1 WK 6 MTH       THIS WK 2 WK 1 YEAR     Cardiac CT Calcium scoring  STAT 1 WK 6 MTH       THIS WK 2 WK 1 YEAR     CTA Chest, Abdomen & Pelvis  STAT 1 WK 6 MTH       THIS WK 2 WK 1 YEAR     CT Chest IV w/ Contrast  STAT 1 WK 6 MTH       THIS WK 2 WK 1 YEAR     CT Chest w/o Contrast  STAT 1 WK 6 MTH       THIS WK 2 WK 1 YEAR     CXR  STAT 1 WK 6 MTH       THIS WK 2 WK 1 YEAR     ECHO 25 Stress Complete Limited     MRI- Cardiac  STAT 1 WK 6 MTH       THIS WK 2 WK 1 YEAR     MUGA Scan  STAT 1 WK 6 MTH       THIS WK 2 WK 1 YEAR     Nuclear Stress  Lexiscan Cardiolite     PFT  STAT 1 WK 6 MTH       THIS WK 2 WK 1 YEAR     Treadmill Stress Test  STAT 1 WK 6 MTH       THIS WK 2 WK 1 YEAR     Vascular Duplex  Lower: Right Left Bilat       Upper: Right Left Bilat     Other Test Not Listed:    Monitors Last Date Completed Day's Request/Ordered     Holter  Short term 24 hours 48 hours      Long term 3 days 7 days 14 days   Event   (1-30 days)      Procedures Last Date Performed Procedure Details Date Expected   Additional Notes    ASD Closure        Carotid Angio        Cardioversion        Heart Cath  R L R&L      Peripheral Angio  R L      PFO Closure        PTCA/PCI        MIREYA        MIREYA/Cardioversion        Venogram        Tilt Table

## (undated) DEVICE — CATHETER URET 5FR L70CM TIP 8FR OPN END CONE TIP INJ HUB

## (undated) DEVICE — SET IRRIG L94IN ID0.281IN W/ 4.5IN DST FLX CONN 2 LD ON OFF

## (undated) DEVICE — MARKER SURG SKIN UTIL REGULAR/FINE 2 TIP W/ RUL AND 9 LBL

## (undated) DEVICE — TUBING, SUCTION, 9/32" X 20', STRAIGHT: Brand: MEDLINE INDUSTRIES, INC.

## (undated) DEVICE — TOWEL,OR,DSP,ST,BLUE,STD,6/PK,12PK/CS: Brand: MEDLINE

## (undated) DEVICE — UROLOGIC DRAIN BAG: Brand: UNBRANDED

## (undated) DEVICE — GUIDEWIRE ENDOSCP L150CM DIA0.035IN TIP 3CM PTFE NIT

## (undated) DEVICE — DRAINBAG,ANTI-REFLUX TOWER,L/F,2000ML,LL: Brand: MEDLINE

## (undated) DEVICE — SINGLE PORT MANIFOLD: Brand: NEPTUNE 2

## (undated) DEVICE — SYRINGE MED 10ML LUERLOCK TIP W/O SFTY DISP

## (undated) DEVICE — TRAY PREP DRY W/ PREM GLV 2 APPL 6 SPNG 2 UNDPD 1 OVERWRAP

## (undated) DEVICE — GOWN,SIRUS,POLYRNF,BRTHSLV,XLN/XL,20/CS: Brand: MEDLINE

## (undated) DEVICE — CATHETER,FOLEY,100%SILICONE,16FR,10ML,LF: Brand: MEDLINE

## (undated) DEVICE — SOLUTION IV IRRIG WATER 1000ML POUR BRL 2F7114

## (undated) DEVICE — GLOVE ORANGE PI 7 1/2   MSG9075

## (undated) DEVICE — CONTAINER,SPECIMEN,OR STERILE,4OZ: Brand: MEDLINE

## (undated) DEVICE — PACK,CYSTOSCOPY,PK I,AURORA: Brand: MEDLINE

## (undated) DEVICE — DBD-PACK,CYSTOSCOPY,PK VI,AURORA: Brand: MEDLINE

## (undated) DEVICE — SOLUTION IV IRRIG 1000ML POUR BTL 2F7114

## (undated) DEVICE — YANKAUER,BULB TIP,W/O VENT,RIGID,STERILE: Brand: MEDLINE

## (undated) DEVICE — STERILE POLYISOPRENE POWDER-FREE SURGICAL GLOVES: Brand: PROTEXIS

## (undated) DEVICE — NITINOL STONE RETRIEVAL BASKET: Brand: ZERO TIP

## (undated) DEVICE — TUBING, SUCTION, 9/32" X 10', STRAIGHT: Brand: MEDLINE

## (undated) DEVICE — PREMIUM WET SKIN PREP TRAY: Brand: MEDLINE INDUSTRIES, INC.

## (undated) DEVICE — MAT FLOOR ULTRA ABS 28X48IN